# Patient Record
Sex: FEMALE | Race: OTHER | Employment: FULL TIME | URBAN - NONMETROPOLITAN AREA
[De-identification: names, ages, dates, MRNs, and addresses within clinical notes are randomized per-mention and may not be internally consistent; named-entity substitution may affect disease eponyms.]

---

## 2017-01-12 ENCOUNTER — TELEPHONE (OUTPATIENT)
Dept: OTOLARYNGOLOGY | Age: 57
End: 2017-01-12

## 2017-01-12 DIAGNOSIS — R59.0 ENLARGED LYMPH NODE IN NECK: Primary | ICD-10-CM

## 2017-02-27 ENCOUNTER — TELEPHONE (OUTPATIENT)
Dept: OTOLARYNGOLOGY | Age: 57
End: 2017-02-27

## 2017-03-06 DIAGNOSIS — E03.9 HYPOTHYROIDISM, UNSPECIFIED TYPE: ICD-10-CM

## 2017-03-06 RX ORDER — LEVOTHYROXINE SODIUM 25 MCG
TABLET ORAL
Qty: 60 TABLET | Refills: 0 | Status: SHIPPED | OUTPATIENT
Start: 2017-03-06 | End: 2017-04-20 | Stop reason: DRUGHIGH

## 2017-04-17 ENCOUNTER — OFFICE VISIT (OUTPATIENT)
Dept: FAMILY MEDICINE CLINIC | Age: 57
End: 2017-04-17

## 2017-04-17 VITALS
BODY MASS INDEX: 32.62 KG/M2 | SYSTOLIC BLOOD PRESSURE: 124 MMHG | DIASTOLIC BLOOD PRESSURE: 82 MMHG | RESPIRATION RATE: 20 BRPM | TEMPERATURE: 98.1 F | HEIGHT: 65 IN | HEART RATE: 80 BPM | WEIGHT: 195.8 LBS

## 2017-04-17 DIAGNOSIS — Z13.220 SCREENING, LIPID: ICD-10-CM

## 2017-04-17 DIAGNOSIS — D50.9 IRON DEFICIENCY ANEMIA, UNSPECIFIED IRON DEFICIENCY ANEMIA TYPE: ICD-10-CM

## 2017-04-17 DIAGNOSIS — R10.30 LOWER ABDOMINAL PAIN: ICD-10-CM

## 2017-04-17 DIAGNOSIS — R30.0 BURNING WITH URINATION: Primary | ICD-10-CM

## 2017-04-17 DIAGNOSIS — E03.9 HYPOTHYROIDISM, UNSPECIFIED TYPE: ICD-10-CM

## 2017-04-17 PROCEDURE — G8417 CALC BMI ABV UP PARAM F/U: HCPCS | Performed by: FAMILY MEDICINE

## 2017-04-17 PROCEDURE — 3017F COLORECTAL CA SCREEN DOC REV: CPT | Performed by: FAMILY MEDICINE

## 2017-04-17 PROCEDURE — 99214 OFFICE O/P EST MOD 30 MIN: CPT | Performed by: FAMILY MEDICINE

## 2017-04-17 PROCEDURE — 3014F SCREEN MAMMO DOC REV: CPT | Performed by: FAMILY MEDICINE

## 2017-04-17 PROCEDURE — 1036F TOBACCO NON-USER: CPT | Performed by: FAMILY MEDICINE

## 2017-04-17 PROCEDURE — G8427 DOCREV CUR MEDS BY ELIG CLIN: HCPCS | Performed by: FAMILY MEDICINE

## 2017-04-17 RX ORDER — HYDROXYCHLOROQUINE SULFATE 200 MG/1
200 TABLET, FILM COATED ORAL 2 TIMES DAILY
COMMUNITY
Start: 2017-04-04 | End: 2018-03-01

## 2017-04-17 RX ORDER — LEVOTHYROXINE SODIUM 25 MCG
TABLET ORAL
Qty: 60 TABLET | Refills: 3 | Status: CANCELLED | OUTPATIENT
Start: 2017-04-17

## 2017-04-17 ASSESSMENT — ENCOUNTER SYMPTOMS
NAUSEA: 0
VOMITING: 0
ABDOMINAL PAIN: 1
ANAL BLEEDING: 0
CONSTIPATION: 0
BLOOD IN STOOL: 0
DIARRHEA: 0
CHEST TIGHTNESS: 0
SHORTNESS OF BREATH: 0

## 2017-04-20 ENCOUNTER — TELEPHONE (OUTPATIENT)
Dept: FAMILY MEDICINE CLINIC | Age: 57
End: 2017-04-20

## 2017-04-20 DIAGNOSIS — R82.90 ABNORMAL URINE: Primary | ICD-10-CM

## 2017-04-20 DIAGNOSIS — R31.9 BLOOD IN URINE: ICD-10-CM

## 2017-04-20 RX ORDER — LEVOTHYROXINE SODIUM 0.05 MG/1
50 TABLET ORAL DAILY
COMMUNITY
Start: 2017-04-20 | End: 2018-03-01 | Stop reason: SDUPTHER

## 2017-04-20 RX ORDER — ERGOCALCIFEROL (VITAMIN D2) 1250 MCG
50000 CAPSULE ORAL WEEKLY
COMMUNITY
Start: 2017-04-20 | End: 2018-03-01

## 2017-04-26 ENCOUNTER — TELEPHONE (OUTPATIENT)
Dept: FAMILY MEDICINE CLINIC | Age: 57
End: 2017-04-26

## 2017-08-17 ENCOUNTER — OFFICE VISIT (OUTPATIENT)
Dept: FAMILY MEDICINE CLINIC | Age: 57
End: 2017-08-17
Payer: COMMERCIAL

## 2017-08-17 VITALS
SYSTOLIC BLOOD PRESSURE: 124 MMHG | BODY MASS INDEX: 30.96 KG/M2 | TEMPERATURE: 98 F | DIASTOLIC BLOOD PRESSURE: 60 MMHG | RESPIRATION RATE: 16 BRPM | WEIGHT: 185.8 LBS | HEIGHT: 65 IN | HEART RATE: 72 BPM

## 2017-08-17 DIAGNOSIS — M79.10 MYALGIA: ICD-10-CM

## 2017-08-17 DIAGNOSIS — M25.50 ARTHRALGIA, UNSPECIFIED JOINT: Primary | ICD-10-CM

## 2017-08-17 DIAGNOSIS — E55.9 VITAMIN D DEFICIENCY: ICD-10-CM

## 2017-08-17 PROCEDURE — 99214 OFFICE O/P EST MOD 30 MIN: CPT | Performed by: FAMILY MEDICINE

## 2017-08-17 PROCEDURE — 1036F TOBACCO NON-USER: CPT | Performed by: FAMILY MEDICINE

## 2017-08-17 PROCEDURE — G8427 DOCREV CUR MEDS BY ELIG CLIN: HCPCS | Performed by: FAMILY MEDICINE

## 2017-08-17 PROCEDURE — G8417 CALC BMI ABV UP PARAM F/U: HCPCS | Performed by: FAMILY MEDICINE

## 2017-08-17 PROCEDURE — 3017F COLORECTAL CA SCREEN DOC REV: CPT | Performed by: FAMILY MEDICINE

## 2017-08-17 PROCEDURE — 3014F SCREEN MAMMO DOC REV: CPT | Performed by: FAMILY MEDICINE

## 2017-08-17 ASSESSMENT — ENCOUNTER SYMPTOMS
BLOOD IN STOOL: 0
SHORTNESS OF BREATH: 0
CONSTIPATION: 1
ABDOMINAL PAIN: 0
ANAL BLEEDING: 0
VOMITING: 0
CHEST TIGHTNESS: 0
DIARRHEA: 0
NAUSEA: 0

## 2017-10-17 ENCOUNTER — TELEPHONE (OUTPATIENT)
Dept: FAMILY MEDICINE CLINIC | Age: 57
End: 2017-10-17

## 2017-10-17 NOTE — TELEPHONE ENCOUNTER
Per office note dated 8/17/17: COLONOSCOPY done 6/13/2007 per Dr. Bloom Parkinson- to do in 6/2017- pt to discuss options with  and let our office know. Please advise.

## 2017-11-02 ENCOUNTER — OFFICE VISIT (OUTPATIENT)
Dept: RHEUMATOLOGY | Age: 57
End: 2017-11-02
Payer: COMMERCIAL

## 2017-11-02 VITALS
DIASTOLIC BLOOD PRESSURE: 73 MMHG | HEIGHT: 65 IN | BODY MASS INDEX: 30.89 KG/M2 | HEART RATE: 100 BPM | SYSTOLIC BLOOD PRESSURE: 115 MMHG | WEIGHT: 185.4 LBS

## 2017-11-02 DIAGNOSIS — E55.9 VITAMIN D DEFICIENCY: ICD-10-CM

## 2017-11-02 DIAGNOSIS — M25.50 POLYARTHRALGIA: Primary | ICD-10-CM

## 2017-11-02 DIAGNOSIS — H04.123 DRY EYES: ICD-10-CM

## 2017-11-02 PROCEDURE — G8417 CALC BMI ABV UP PARAM F/U: HCPCS | Performed by: INTERNAL MEDICINE

## 2017-11-02 PROCEDURE — G8427 DOCREV CUR MEDS BY ELIG CLIN: HCPCS | Performed by: INTERNAL MEDICINE

## 2017-11-02 PROCEDURE — G8484 FLU IMMUNIZE NO ADMIN: HCPCS | Performed by: INTERNAL MEDICINE

## 2017-11-02 PROCEDURE — 99244 OFF/OP CNSLTJ NEW/EST MOD 40: CPT | Performed by: INTERNAL MEDICINE

## 2017-11-02 PROCEDURE — 3014F SCREEN MAMMO DOC REV: CPT | Performed by: INTERNAL MEDICINE

## 2017-11-02 PROCEDURE — 3017F COLORECTAL CA SCREEN DOC REV: CPT | Performed by: INTERNAL MEDICINE

## 2017-11-02 RX ORDER — MELOXICAM 7.5 MG/1
7.5 TABLET ORAL DAILY
Qty: 30 TABLET | Refills: 2 | Status: SHIPPED | OUTPATIENT
Start: 2017-11-02 | End: 2018-03-01

## 2017-11-02 ASSESSMENT — ENCOUNTER SYMPTOMS
BLURRED VISION: 0
DOUBLE VISION: 0
EYE REDNESS: 0
EYE PAIN: 0
CONSTIPATION: 1
HEARTBURN: 1
RESPIRATORY NEGATIVE: 1

## 2017-11-02 NOTE — PROGRESS NOTES
Bloating and Constipation.  Pcn [Penicillins] Hives    Ciprofloxacin Hcl Rash       CURRENT MEDICATIONS  Current Outpatient Prescriptions   Medication Sig Dispense Refill    ergocalciferol (DRISDOL) 29960 UNITS capsule Take 50,000 Units by mouth once a week       levothyroxine (SYNTHROID) 50 MCG tablet Take 1 tablet by mouth Daily      hydroxychloroquine (PLAQUENIL) 200 MG tablet Take 200 mg by mouth 2 times daily       ranitidine (ZANTAC) 150 MG tablet Take 1 tablet by mouth nightly (Patient taking differently: Take 150 mg by mouth nightly as needed for Heartburn ) 90 tablet 3    ibuprofen (ADVIL) 200 MG tablet Take 1 tablet by mouth every 6 hours as needed for Pain (Patient taking differently: Take 400 mg by mouth every 6 hours as needed for Pain ) 120 tablet 3    aspirin 81 MG tablet Take 81 mg by mouth daily      Multiple Vitamin (MULTIVITAMIN PO) Take  by mouth daily.  esomeprazole Magnesium (NEXIUM) 40 MG PACK Take 20 mg by mouth as needed        No current facility-administered medications for this visit. Objective:  /73   Pulse 100   Ht 5' 5\" (1.651 m)   Wt 185 lb 6.4 oz (84.1 kg)   BMI 30.85 kg/m²     General: No distress. Alert. Eyes: P ERRL. No sclera icterus. No conjunctival injection. ENT: No discharge. Pharynx clear. Neck: Trachea midline. Normal thyroid. Resp: No accessory muscle use. No crackles. No wheezing. No rhonchi. No dullness on percussion. CV: Regular rate. Regular rhythm. No mumur or rub. No edema. GI: Non-tender. Non-distended. No masses. No organmegaly. Normal bowel sounds. M/S:   Upper extremities:  Muscle strength 5/5, FROM, No Synovitis   Shoulders: (+) bilateral Harish, speed, lift off, hawking, infraspinatus and scarf testing.  (+) tenderness of the shoulders  Elbows: bilateral elbow tenderness  Wrist: negative tinel and Phalen testing. Hands: 4th left PIP tenderness. Negative synovitis.      Lower Extremities:   Muscle strength 5/5, FROM, No Synovitis   Hips: left > right lateral hip pain, left hip worse with internal rotation of the hip. Knee: mild crepitus, mild pain with patellar grind, negative bilateral Harish, anterior/posterior draw, medial/lateral compression  Ankles/feet: no swelling, redness,warmth ,or tenderness     Spine:   - C-spine: tenderness along the posterior c-spine.   - Lumbar spine tenderness, negative SLR or Cross SLR.   - Sacral spine tenderness     There are 14/18 tender fibromyalgia points. Neuro: DTR's 2/4 bilat and equal  Psych: Oriented to person, place, time. No anxiety or agitation. Skin: Warm and dry. No nodule on exposed extremities. No rash on exposed extremities. Lymph: No cervical LAD. No supraclavicular LAD.       RAPID 3    LABS:  CBC  Lab Results   Component Value Date    WBC 6.8 04/18/2017    RBC 4.62 04/18/2017    HGB 13.3 04/18/2017    HCT 40.4 04/18/2017    MCV 87.6 04/18/2017    MCH 28.8 04/18/2017    MCHC 32.9 04/18/2017    RDW 13.3 04/18/2017     04/18/2017       CMP  Lab Results   Component Value Date    CALCIUM 9.2 04/18/2017    LABALBU 4.1 04/18/2017    PROT 7.2 04/18/2017     04/18/2017    K 4.4 04/18/2017    CO2 26 04/18/2017    CL 99 04/18/2017    BUN 13 04/18/2017    CREATININE 0.6 04/18/2017    ALT 22 04/18/2017    AST 20 04/18/2017       HgBA1c: No components found for: HGBA1C    Lab Results   Component Value Date    TSH 2.650 04/18/2017     Lab Results   Component Value Date    VITD25 13 04/18/2017         No results found for: AKANKSHA  No components found for: SSAABIGGREF  No components found for: SSBABIGGREF  No components found for: SMITHABIGGAR  No results found for: DSDNAAB   No results found for: C3, C4  No components found for: CYCCITPEPIGG  No results found for: RF    No components found for: CANCASCRN, APANCASCRN  Lab Results   Component Value Date    SEDRATE 2 05/31/2016     Lab Results   Component Value Date    CRP <0.20 05/31/2016       AKANKSHA: negative (IFA)  DsDNA 189 (<302)  [place] <1  U1RNP 1 (<7)  RNP70 (<1) (<7)  SSA < 1 (<7)  SSB < 1 (<7)  CENP < 1 (<7)  Penny-1 < 1 (<7)  RF IgM < 1 (<3.5)  RF IgA 3 (<14)  CCP IgG 1 (< 7)  MCV 9  (< 20)  Cardiolipin IgM 2 (<20)  Cardiolipin IgG < 3 (< 20)  B2GP IgM < 1 (<21)  B2GP IgG < 6 (<21)  Thyroglobulin IgG 48 (40-60 equivocal)  TPO IgG 615 (> 35 positive)     Assessment/ Plan:  1. Polyarthralgia/myalgia:   Generalized joint pains in the fingers, wrist, elbows, shoulders, hips, knees, ankles, feet, neck and back. Most severe pain lower back. Symptoms started: ~ 2 years ago (2015) with joint pain in the shoulder,elbows, fingers, hips, knees. tenderness along the lower legs &  upper arms for the past year (since 2016). Pain on a scale 0-10: pain ranges 5-7/10. Type of pain: constant pain in the shoulders w/ some referral to the upper arms. Shooting pain from the lower back and left lateral thigh to the anterior thigh above the knee. The other joint pains are intermittent localized. Timing: mornings and evenings after sitting down. Aggravating factors: weather changes. Shoulder: prayers, knee: kneeling, prayers. Pressures along the upper arms and lower legs. Alleviating factors: Ibuprofen 800mg qd (moderate to a lot relief). (+) dry mouth/Dry eyes. - exam with wide spread tenderness, 14/18 FMS tender point. (+) lower back tenderness with some radicular sx's in the left leg mainly occurring in the evening. Negative SLR, Cross SLR and intact muscle strength and DTR's.    - x-rays of the shouders and back if sx's continue    - negative serologic evaluation for CTD 12/2016 so lower suspicion for SLE, sjogren's. MCTD, inflammatory myopathy     If normal labs will wean off of plaquenil.     - currently pt would have wide spread sx's consistent with myofascial pain syndrome/Fibromyalgia/pain amplification process and would recommend treating symptomticaly    - Previous therapy: Tylenol (no relief), Naproxen (no relief), prednisone taper (significant relief). - Current therapy: Plaquenil 200mg bid, Ibuprofen 800mg.    - start mobic 7.5mg qd prn. we have discussed the use of nonsteroidal anti-inflammatory medications which include but not limited to Gastrointestinal toxicity (such as ulceration and bleeding), renal toxicity, liver toxicity, and potential cardiovascular toxicity. - hold Ibuprofen. - C-reactive protein; Future  - Sedimentation Rate; Future  - meloxicam (MOBIC) 7.5 MG tablet; Take 1 tablet by mouth daily  Dispense: 30 tablet; Refill: 2     2. Vitamin D def:   - re-evaluation of vitamin D level   - 1-25-oh vitamin D of 99.6 (20-82) 10/26/17    3. Osteoporosis:   - treated by Gynecologist  - was previously treated by w/ alendronate 70mg q wk x 3 years, stopped a few days ago by [employer].   - last DXA was 10/31/17.     4. Sicca sx's   - pt w/ dry eyes and dry mouth. Negative AKANKSHA, SSA, SSB, normal ESR/CRP  - discussed minor salivary gland biopsy but both and I would like to hold off at this time. - with negative serologies, the only way the pt could meet the diagnostic criteria of sjogren's would be to have a (+) minor salivary gland bx  - several other processes can be associated with chronic dry mouth and dry eyes sx's. No Follow-up on file. Electronically signed by Jennifer Callahan DO on 11/2/2017 at 2:52 PM      Thank you for allowing me to participate in the care of this patient. Please call if there are any questions.

## 2018-03-01 ENCOUNTER — HOSPITAL ENCOUNTER (OUTPATIENT)
Age: 58
Discharge: HOME | End: 2018-03-01
Payer: COMMERCIAL

## 2018-03-01 ENCOUNTER — OFFICE VISIT (OUTPATIENT)
Dept: FAMILY MEDICINE CLINIC | Age: 58
End: 2018-03-01
Payer: COMMERCIAL

## 2018-03-01 ENCOUNTER — HOSPITAL ENCOUNTER (OUTPATIENT)
Dept: GENERAL RADIOLOGY | Age: 58
Discharge: HOME | End: 2018-03-01
Payer: COMMERCIAL

## 2018-03-01 VITALS
TEMPERATURE: 98.2 F | HEART RATE: 72 BPM | WEIGHT: 187 LBS | DIASTOLIC BLOOD PRESSURE: 70 MMHG | RESPIRATION RATE: 16 BRPM | SYSTOLIC BLOOD PRESSURE: 104 MMHG | BODY MASS INDEX: 31.16 KG/M2 | HEIGHT: 65 IN

## 2018-03-01 DIAGNOSIS — Z12.31 VISIT FOR SCREENING MAMMOGRAM: ICD-10-CM

## 2018-03-01 DIAGNOSIS — K21.9 GASTROESOPHAGEAL REFLUX DISEASE, ESOPHAGITIS PRESENCE NOT SPECIFIED: ICD-10-CM

## 2018-03-01 DIAGNOSIS — M54.2 NECK PAIN ON RIGHT SIDE: ICD-10-CM

## 2018-03-01 DIAGNOSIS — E73.9 LACTOSE INTOLERANCE: ICD-10-CM

## 2018-03-01 DIAGNOSIS — Z12.11 ENCOUNTER FOR SCREENING COLONOSCOPY: ICD-10-CM

## 2018-03-01 DIAGNOSIS — R30.0 DYSURIA: ICD-10-CM

## 2018-03-01 DIAGNOSIS — E03.9 HYPOTHYROIDISM, UNSPECIFIED TYPE: Primary | ICD-10-CM

## 2018-03-01 PROCEDURE — 3014F SCREEN MAMMO DOC REV: CPT | Performed by: FAMILY MEDICINE

## 2018-03-01 PROCEDURE — 99214 OFFICE O/P EST MOD 30 MIN: CPT | Performed by: FAMILY MEDICINE

## 2018-03-01 PROCEDURE — G8417 CALC BMI ABV UP PARAM F/U: HCPCS | Performed by: FAMILY MEDICINE

## 2018-03-01 PROCEDURE — G8427 DOCREV CUR MEDS BY ELIG CLIN: HCPCS | Performed by: FAMILY MEDICINE

## 2018-03-01 PROCEDURE — 1036F TOBACCO NON-USER: CPT | Performed by: FAMILY MEDICINE

## 2018-03-01 PROCEDURE — 72050 X-RAY EXAM NECK SPINE 4/5VWS: CPT

## 2018-03-01 PROCEDURE — 3017F COLORECTAL CA SCREEN DOC REV: CPT | Performed by: FAMILY MEDICINE

## 2018-03-01 PROCEDURE — G8484 FLU IMMUNIZE NO ADMIN: HCPCS | Performed by: FAMILY MEDICINE

## 2018-03-01 RX ORDER — LEVOTHYROXINE SODIUM 0.05 MG/1
50 TABLET ORAL DAILY
Qty: 90 TABLET | Refills: 3 | Status: SHIPPED | OUTPATIENT
Start: 2018-03-01 | End: 2018-05-30 | Stop reason: SDUPTHER

## 2018-03-01 RX ORDER — CYCLOBENZAPRINE HCL 5 MG
5 TABLET ORAL 3 TIMES DAILY PRN
Qty: 30 TABLET | Refills: 0 | Status: SHIPPED | OUTPATIENT
Start: 2018-03-01 | End: 2018-03-11

## 2018-03-01 ASSESSMENT — ENCOUNTER SYMPTOMS
SHORTNESS OF BREATH: 0
CHEST TIGHTNESS: 0
VOMITING: 0
CONSTIPATION: 1
ANAL BLEEDING: 0
ABDOMINAL PAIN: 0
NAUSEA: 0
DIARRHEA: 1
BLOOD IN STOOL: 0

## 2018-03-01 NOTE — PROGRESS NOTES
[facility]  [facility]  Dept: [phone]  Dept Fax: [phone]    [facility] Denise Rhoades is a 56 y. o.female    Pt feeling ok since last visit- no new complaints, issues, or problems, except as noted below:     Pt saw Dr. Francheska Davenport since last visit- never tried Mobic to help with pain as pt wished to avoid    Pt missed follow up appt- pt to reschedule soon. Pt states she was recently in [place] and felt significantly better without aches and pains. Pt tripped this morning over small bush and landed face first- no LOC. Pt hit chin on grass as she fell. Pt now with pain in neck region and into right shoulder. No extremity numbness, tingling, or weakness. Pt scheduled to have eye muscle surgery with Dr. Jasper Michelle on 3/26/2018 in [place], [place]. Patient Active Problem List   Diagnosis    Lactose intolerance    Iron deficiency anemia    Diplopia    Osteoporosis    Hypothyroidism    Gastroesophageal reflux disease       Current Outpatient Prescriptions   Medication Sig Dispense Refill    levothyroxine (SYNTHROID) 50 MCG tablet Take 1 tablet by mouth Daily 90 tablet 3    cyclobenzaprine (FLEXERIL) 5 MG tablet Take 1 tablet by mouth 3 times daily as needed for Muscle spasms 30 tablet 0    ranitidine (ZANTAC) 150 MG tablet Take 1 tablet by mouth nightly (Patient taking differently: Take 150 mg by mouth nightly as needed for Heartburn ) 90 tablet 3    ibuprofen (ADVIL) 200 MG tablet Take 1 tablet by mouth every 6 hours as needed for Pain (Patient taking differently: Take 400 mg by mouth every 6 hours as needed for Pain ) 120 tablet 3    esomeprazole Magnesium (NEXIUM) 40 MG PACK Take 20 mg by mouth as needed       Multiple Vitamin (MULTIVITAMIN PO) Take  by mouth daily. No current facility-administered medications for this visit. Review of Systems   Constitutional: Positive for chills (occasionally).  Negative for diaphoresis, fatigue, fever and unexpected weight change. Eyes: Negative for visual disturbance. Respiratory: Negative for chest tightness and shortness of breath. Cardiovascular: Negative for chest pain, palpitations and leg swelling. Gastrointestinal: Positive for constipation (intermittent,  due to diet) and diarrhea ( intermittent,  due to diet). Negative for abdominal pain, anal bleeding, blood in stool, nausea and vomiting. Genitourinary: Negative for dysuria and hematuria. Musculoskeletal: Positive for arthralgias (neck). Negative for neck pain. Neurological: Negative for dizziness, light-headedness and headaches. OBJECTIVE     /70 (Site: Left Arm, Position: Sitting)   Pulse 72   Temp 98.2 °F (36.8 °C) (Oral)   Resp 16   Ht 5' 5\" (1.651 m)   Wt 187 lb (84.8 kg)   BMI 31.12 kg/m²     Wt Readings from Last 3 Encounters:   03/01/18 187 lb (84.8 kg)   11/02/17 185 lb 6.4 oz (84.1 kg)   08/17/17 185 lb 12.8 oz (84.3 kg)     Body mass index is 31.12 kg/m². Physical Exam   Constitutional: She is oriented to person, place, and time. She appears well-developed and well-nourished. HENT:   Head: Normocephalic and atraumatic. Right Ear: External ear normal.   Left Ear: External ear normal.   Nose: Nose normal.   Mouth/Throat: Oropharynx is clear and moist.   Eyes: Conjunctivae and EOM are normal. Pupils are equal, round, and reactive to light. Neck: Normal range of motion. Neck supple. Cardiovascular: Normal rate, regular rhythm, normal heart sounds and intact distal pulses. Pulmonary/Chest: Effort normal and breath sounds normal.   Abdominal: Soft. Bowel sounds are normal.   Musculoskeletal: Normal range of motion. Neurological: She is alert and oriented to person, place, and time. She has normal reflexes. Skin: Skin is warm and dry. Psychiatric: She has a normal mood and affect. Her behavior is normal. Judgment and thought content normal.   Nursing note and vitals reviewed.             Component 3/1/2018)  Encouraged TETANUS SHOT (TdaP/ ADACEL/ BOOSTRIX) per personal pharmacy. Encouraged NEW SHINGLES SHOT [facility]) - check with insurance re coverage and location of administration. PAP/ PELVIC management per Dr. Faizan Ayers- done 9/2017- to follow up annually. MAMMO overdue at this time- order given. COLONOSCOPY done 6/13/2007 per Dr. Mercedes Jin- was to do in 6/2017-  Will refer to Dr. Mercedes Jin at this time. DEXA done 10/31/2017 per Dr. Tanya Cisneros- to do again 10/2019. Pt declines HIV and HEP C screening at this time due to lack of risk factors. Check x-rays of cervical spine  Flexeril 5 mg- 1 pill 3x/day for 10 days #30/ no refills  OK to continue Ibuprofen  mg- 4 pills 3x/day as needed for pain. Gentle massage to affected area. Follow up with Dr. Palma Valencia as planned  Check FLP, CMP, UA with reflex C&S, FREE T4, and TSH with VIT D 25 OH, ESR, CRP per Dr. Ravindra Dotson after 4/18/2018  Refill   Follow up in 6 months.         Electronically signed by Carmen Wilder MD on 3/1/2018 at 10:00 AM

## 2018-04-27 ENCOUNTER — TELEPHONE (OUTPATIENT)
Dept: FAMILY MEDICINE CLINIC | Age: 58
End: 2018-04-27

## 2018-04-27 DIAGNOSIS — R79.89 ELEVATED TSH: Primary | ICD-10-CM

## 2018-04-27 DIAGNOSIS — E03.9 HYPOTHYROIDISM, UNSPECIFIED TYPE: ICD-10-CM

## 2018-04-27 LAB
ALBUMIN SERPL-MCNC: 4.3 G/DL (ref 3.5–5.2)
ALK PHOSPHATASE: 77 U/L (ref 30–111)
ALT SERPL-CCNC: 20 U/L (ref 5–40)
ANION GAP SERPL CALCULATED.3IONS-SCNC: 14 MEQ/L (ref 10–19)
AST SERPL-CCNC: 17 U/L (ref 9–40)
BILIRUB SERPL-MCNC: 0.5 MG/DL
BUN BLDV-MCNC: 17 MG/DL (ref 8–23)
CALCIUM SERPL-MCNC: 8.8 MG/DL (ref 8.5–10.5)
CHLORIDE BLD-SCNC: 101 MEQ/L (ref 95–107)
CO2: 26 MEQ/L (ref 19–31)
CREAT SERPL-MCNC: 0.7 MG/DL (ref 0.6–1.3)
EGFR AFRICAN AMERICAN: 113 ML/MIN/1.73 M2
EGFR IF NONAFRICAN AMERICAN: 97.5 ML/MIN/1.73 M2
GLUCOSE: 80 MG/DL (ref 70–99)
POTASSIUM SERPL-SCNC: 4.7 MEQ/L (ref 3.5–5.4)
SODIUM BLD-SCNC: 141 MEQ/L (ref 135–146)
T4 FREE: 1.37 NG/DL (ref 0.8–1.9)
TOTAL PROTEIN: 6.6 G/DL (ref 6.1–8.3)
TSH SERPL DL<=0.05 MIU/L-ACNC: 4.59 UIU/ML (ref 0.4–4.1)

## 2018-05-30 RX ORDER — LEVOTHYROXINE SODIUM 0.07 MG/1
75 TABLET ORAL DAILY
Qty: 30 TABLET | Refills: 1 | Status: SHIPPED | OUTPATIENT
Start: 2018-05-30 | End: 2018-07-10 | Stop reason: SDUPTHER

## 2018-07-07 LAB
T4 FREE: 1.44 NG/DL (ref 0.8–1.9)
TSH SERPL DL<=0.05 MIU/L-ACNC: 1.98 UIU/ML (ref 0.4–4.1)

## 2018-07-10 ENCOUNTER — TELEPHONE (OUTPATIENT)
Dept: FAMILY MEDICINE CLINIC | Age: 58
End: 2018-07-10

## 2018-07-10 DIAGNOSIS — E03.9 HYPOTHYROIDISM, UNSPECIFIED TYPE: ICD-10-CM

## 2018-07-10 RX ORDER — LEVOTHYROXINE SODIUM 0.07 MG/1
75 TABLET ORAL DAILY
Qty: 90 TABLET | Refills: 3 | Status: SHIPPED | OUTPATIENT
Start: 2018-07-10 | End: 2019-07-24 | Stop reason: SDUPTHER

## 2018-07-10 NOTE — TELEPHONE ENCOUNTER
Pt notified of results. She states that she will need a refill and is asking for #90/3. Med pended, pharmacy verified.

## 2018-09-06 ENCOUNTER — TELEPHONE (OUTPATIENT)
Dept: FAMILY MEDICINE CLINIC | Age: 58
End: 2018-09-06

## 2018-09-06 DIAGNOSIS — R39.15 URINARY URGENCY: ICD-10-CM

## 2018-09-06 DIAGNOSIS — R30.0 DYSURIA: ICD-10-CM

## 2018-09-06 DIAGNOSIS — Z00.00 LABORATORY EXAMINATION ORDERED AS PART OF A ROUTINE GENERAL MEDICAL EXAMINATION: Primary | ICD-10-CM

## 2018-09-06 NOTE — TELEPHONE ENCOUNTER
Patient rescheduled today's appointment to 9/18/18. She is asking if she needs to do any labs prior? Please advise and let her know.

## 2018-09-18 ENCOUNTER — TELEPHONE (OUTPATIENT)
Dept: FAMILY MEDICINE CLINIC | Age: 58
End: 2018-09-18

## 2018-09-18 NOTE — TELEPHONE ENCOUNTER
Patient called and cancelled today's appt, wondering if there was anyway that she could possibly make an appt on any Thursday mornings that she could be fit in please. Dr. Calvin Santizo schedule is booked. Pt did state she did not have blood work done, pt did r/s from 9/6 and then from today.     Please advise pt [phone]

## 2018-11-15 ENCOUNTER — TELEPHONE (OUTPATIENT)
Dept: FAMILY MEDICINE CLINIC | Age: 58
End: 2018-11-15

## 2018-12-17 ENCOUNTER — HOSPITAL ENCOUNTER (OUTPATIENT)
Dept: WOMENS IMAGING | Age: 58
Discharge: HOME | End: 2018-12-17
Payer: COMMERCIAL

## 2018-12-17 DIAGNOSIS — Z12.31 VISIT FOR SCREENING MAMMOGRAM: ICD-10-CM

## 2018-12-17 PROCEDURE — 77067 SCR MAMMO BI INCL CAD: CPT

## 2019-01-21 ENCOUNTER — HOSPITAL ENCOUNTER (OUTPATIENT)
Dept: NON INVASIVE DIAGNOSTICS | Age: 59
Discharge: HOME | End: 2019-01-21
Payer: COMMERCIAL

## 2019-01-21 VITALS — HEIGHT: 66 IN | WEIGHT: 172 LBS | BODY MASS INDEX: 27.64 KG/M2

## 2019-01-21 PROCEDURE — 93660 TILT TABLE EVALUATION: CPT | Performed by: INTERNAL MEDICINE

## 2019-01-21 PROCEDURE — 2709999900 HC NON-CHARGEABLE SUPPLY

## 2019-07-24 DIAGNOSIS — E03.9 HYPOTHYROIDISM, UNSPECIFIED TYPE: ICD-10-CM

## 2019-07-26 RX ORDER — LEVOTHYROXINE SODIUM 0.07 MG/1
TABLET ORAL
Qty: 30 TABLET | Refills: 0 | Status: SHIPPED | OUTPATIENT
Start: 2019-07-26 | End: 2019-08-05 | Stop reason: SDUPTHER

## 2019-08-05 ENCOUNTER — OFFICE VISIT (OUTPATIENT)
Dept: FAMILY MEDICINE CLINIC | Age: 59
End: 2019-08-05
Payer: COMMERCIAL

## 2019-08-05 VITALS
RESPIRATION RATE: 14 BRPM | TEMPERATURE: 97.9 F | SYSTOLIC BLOOD PRESSURE: 110 MMHG | DIASTOLIC BLOOD PRESSURE: 68 MMHG | WEIGHT: 174 LBS | HEART RATE: 64 BPM | BODY MASS INDEX: 28.08 KG/M2

## 2019-08-05 DIAGNOSIS — M77.02 MEDIAL EPICONDYLITIS OF ELBOW, LEFT: ICD-10-CM

## 2019-08-05 DIAGNOSIS — M81.0 OSTEOPOROSIS WITHOUT CURRENT PATHOLOGICAL FRACTURE, UNSPECIFIED OSTEOPOROSIS TYPE: ICD-10-CM

## 2019-08-05 DIAGNOSIS — K21.9 GASTROESOPHAGEAL REFLUX DISEASE, ESOPHAGITIS PRESENCE NOT SPECIFIED: ICD-10-CM

## 2019-08-05 DIAGNOSIS — Z12.11 COLON CANCER SCREENING: ICD-10-CM

## 2019-08-05 DIAGNOSIS — M79.672 PAIN OF LEFT HEEL: ICD-10-CM

## 2019-08-05 DIAGNOSIS — D64.9 ANEMIA, UNSPECIFIED TYPE: ICD-10-CM

## 2019-08-05 DIAGNOSIS — E03.9 HYPOTHYROIDISM, UNSPECIFIED TYPE: Primary | ICD-10-CM

## 2019-08-05 PROCEDURE — 99215 OFFICE O/P EST HI 40 MIN: CPT | Performed by: FAMILY MEDICINE

## 2019-08-05 PROCEDURE — 1036F TOBACCO NON-USER: CPT | Performed by: FAMILY MEDICINE

## 2019-08-05 PROCEDURE — 3017F COLORECTAL CA SCREEN DOC REV: CPT | Performed by: FAMILY MEDICINE

## 2019-08-05 PROCEDURE — G8419 CALC BMI OUT NRM PARAM NOF/U: HCPCS | Performed by: FAMILY MEDICINE

## 2019-08-05 PROCEDURE — G8427 DOCREV CUR MEDS BY ELIG CLIN: HCPCS | Performed by: FAMILY MEDICINE

## 2019-08-05 RX ORDER — LEVOTHYROXINE SODIUM 0.07 MG/1
TABLET ORAL
Qty: 90 TABLET | Refills: 3 | Status: SHIPPED | OUTPATIENT
Start: 2019-08-05 | End: 2020-07-28

## 2019-08-05 ASSESSMENT — PATIENT HEALTH QUESTIONNAIRE - PHQ9
SUM OF ALL RESPONSES TO PHQ QUESTIONS 1-9: 0
SUM OF ALL RESPONSES TO PHQ9 QUESTIONS 1 & 2: 0
SUM OF ALL RESPONSES TO PHQ QUESTIONS 1-9: 0
1. LITTLE INTEREST OR PLEASURE IN DOING THINGS: 0
2. FEELING DOWN, DEPRESSED OR HOPELESS: 0

## 2019-08-05 ASSESSMENT — ENCOUNTER SYMPTOMS
ANAL BLEEDING: 0
SHORTNESS OF BREATH: 0
BLOOD IN STOOL: 0
CONSTIPATION: 0
VOMITING: 0
DIARRHEA: 0
NAUSEA: 0
ABDOMINAL PAIN: 0
CHEST TIGHTNESS: 0

## 2019-08-05 NOTE — PROGRESS NOTES
[facility]  [facility]  Dept: [phone]  Dept Fax: [phone]    [facility] Jossie Roche is a 66 y. o.female    Pt presents for follow up of Hypothyroidism     Pt feeling ok since last visit- no new complaints, issues, or problems, except as noted below:   Pt had 3 episodes of lightheadedness/ fainting in 2/2019 while in [place] and back home. Pt had Tilt Table, Stress test, Echo- all ok. No issues since that time. Pt continues seeing Dr. Pati Kelley for Iron Deficiency Anemia. Pt seeing her dentist for right sided jaw pain- to see 9/4/2019. Weight idecreased 13# since last visit 17 months ago. Pt complains of left heel pain- worst in AM- intermittent in nature. No numbness, tingling, or weakness. Pt interested in seeing Podiatry at this time. Pt complains of \"flesh hurting\" in bicep/ tricep region, as well as between knees and ankles bilaterally. Pt also with left medial epicondyle pain with movement. Pt states pain is intermittent and worse with movement. Patient Active Problem List   Diagnosis    Lactose intolerance    Iron deficiency anemia    Diplopia    Osteoporosis    Hypothyroidism    Gastroesophageal reflux disease       Current Outpatient Medications   Medication Sig Dispense Refill    levothyroxine (SYNTHROID) 75 MCG tablet TAKE 1 TABLET BY MOUTH ONCE DAILY 90 tablet 3    ranitidine (ZANTAC) 150 MG tablet Take 1 tablet by mouth nightly (Patient taking differently: Take 150 mg by mouth nightly as needed for Heartburn ) 90 tablet 3    ibuprofen (ADVIL) 200 MG tablet Take 1 tablet by mouth every 6 hours as needed for Pain (Patient taking differently: Take 400 mg by mouth every 6 hours as needed for Pain ) 120 tablet 3    esomeprazole Magnesium (NEXIUM) 40 MG PACK Take 40 mg by mouth as needed       Multiple Vitamin (MULTIVITAMIN PO) Take  by mouth daily. No current facility-administered medications for this visit. Gastroesophageal reflux disease, esophagitis presence not specified     5. Colon cancer screening  AFL - Artnivia Pretty MD, Gastroenterology, [facility]   6. Pain of left heel  Amb External Referral To Podiatry   7. Medial epicondylitis of elbow, left  Tonie Stockton MD, Orthopedic Surgery, [facility]      Encouraged annual FLU VACCINE- pt declines. (updated 8/5/2019)  Encouraged TETANUS SHOT (TdaP/ ADACEL/ BOOSTRIX) per personal pharmacy. Encouraged NEW SHINGLES SHOT [facility]) - check with insurance re coverage and location of administration. (Pt declines at this time- 8/5/2019)  PAP/ PELVIC management per Dr. Dawn- done 9/2017??- to follow up annually. Please check on date of next appt ([id number])  MAMMO due after 12/17/2019- to get order per Dr. Dawn per pt preference. COLONOSCOPY done 6/13/2007 per Dr. Chana Ibarra- was to do in 6/2017-  Will refer to Dr. Luba Roque at this time. Will also consider EGD to evaluate for causes of Iron Deficiency Anemia. (updated 8/5/2019)  DEXA done 10/31/2017 per Dr. Dionna Calderon- to do again 10/2019- pt to discuss with Dr. Dawn at upcoming appt. Pt declines HIV and HEP C screening at this time due to lack of risk factors. (updated 8/5/2019)  Will hold on repeat labs at this time as completed in 2/2019 and ok per pt report- will track down copy of these labs. After discussion with pt, will refer to Dr. Jasmyn Gore at this time. After discussion with pt, will refer to Dr. Andry Schaffer for evaluation of left medial epicondyle pain. Refill Synthroid  Follow up in 6-12 months. Electronically signed by Merlin Morrison MD on 8/5/2019 at 12:29 PM     Greater than 40 minutes was spent in face-to face contact with patient with greater than 50% in counseling and coordination of care.

## 2019-11-19 ENCOUNTER — HOSPITAL ENCOUNTER (OUTPATIENT)
Dept: WOMENS IMAGING | Age: 59
Discharge: HOME | End: 2019-11-19
Payer: COMMERCIAL

## 2019-11-19 DIAGNOSIS — M85.89 OSTEOPENIA OF MULTIPLE SITES: ICD-10-CM

## 2019-11-19 PROCEDURE — 77080 DXA BONE DENSITY AXIAL: CPT

## 2019-12-18 ENCOUNTER — HOSPITAL ENCOUNTER (OUTPATIENT)
Dept: WOMENS IMAGING | Age: 59
Discharge: HOME | End: 2019-12-18
Payer: COMMERCIAL

## 2019-12-18 DIAGNOSIS — Z12.31 VISIT FOR SCREENING MAMMOGRAM: ICD-10-CM

## 2019-12-18 PROCEDURE — 77063 BREAST TOMOSYNTHESIS BI: CPT

## 2020-01-22 ENCOUNTER — OFFICE VISIT (OUTPATIENT)
Dept: SURGERY | Age: 60
End: 2020-01-22
Payer: COMMERCIAL

## 2020-01-22 VITALS
HEIGHT: 66 IN | TEMPERATURE: 97.6 F | WEIGHT: 177 LBS | OXYGEN SATURATION: 98 % | SYSTOLIC BLOOD PRESSURE: 118 MMHG | DIASTOLIC BLOOD PRESSURE: 80 MMHG | RESPIRATION RATE: 18 BRPM | BODY MASS INDEX: 28.45 KG/M2 | HEART RATE: 80 BPM

## 2020-01-22 PROCEDURE — 1036F TOBACCO NON-USER: CPT | Performed by: SURGERY

## 2020-01-22 PROCEDURE — G8484 FLU IMMUNIZE NO ADMIN: HCPCS | Performed by: SURGERY

## 2020-01-22 PROCEDURE — G8419 CALC BMI OUT NRM PARAM NOF/U: HCPCS | Performed by: SURGERY

## 2020-01-22 PROCEDURE — G8427 DOCREV CUR MEDS BY ELIG CLIN: HCPCS | Performed by: SURGERY

## 2020-01-22 PROCEDURE — 3017F COLORECTAL CA SCREEN DOC REV: CPT | Performed by: SURGERY

## 2020-01-22 PROCEDURE — 99203 OFFICE O/P NEW LOW 30 MIN: CPT | Performed by: SURGERY

## 2020-01-23 ASSESSMENT — ENCOUNTER SYMPTOMS
WHEEZING: 0
COLOR CHANGE: 0
CHOKING: 0
COUGH: 0
NAUSEA: 0
EYE ITCHING: 0
VOMITING: 0
DIARRHEA: 0
ABDOMINAL PAIN: 0
SINUS PRESSURE: 0
BLOOD IN STOOL: 0
ALLERGIC/IMMUNOLOGIC NEGATIVE: 1
EYE DISCHARGE: 0
VOICE CHANGE: 0
PHOTOPHOBIA: 0
CHEST TIGHTNESS: 0
RHINORRHEA: 0
STRIDOR: 0
APNEA: 0
ABDOMINAL DISTENTION: 0
BACK PAIN: 0
CONSTIPATION: 0
EYE REDNESS: 0
EYE PAIN: 0
TROUBLE SWALLOWING: 0
ANAL BLEEDING: 0
RECTAL PAIN: 1
SHORTNESS OF BREATH: 0
FACIAL SWELLING: 0
SORE THROAT: 0

## 2020-01-23 NOTE — PROGRESS NOTES
Subjective:      Patient ID: John Trevino is a 64 y.o. female. Chief Complaint   Patient presents with    Surgical Consult     New patient-referred by Dr Silvia mccoy     HPI  Kari Estrada is a 61-year-old female who presents for initial evaluation secondary to anal pain. This is been going on now for several weeks. Colonoscopy back in December 2, 2019. At that time found to have some prolapsing internal hemorrhoids along with posterior anal infection/abscess. She states she has really had no improvement. Concern for fissure. Very painful especially after bowel movements. Tender to palpation. Occasionally will have the area drained and then it seems to get better only to get worse again. Colonoscopy otherwise did not demonstrate any significant concerning polyps. Otherwise, tolerating diet. Normal bowel function and denies any significant chronic severe constipation. No hematochezia or melena. No unexplained weight loss. No fever, chills or sweats. No chest pain or shortness of breath. Review of Systems   Constitutional: Positive for chills, diaphoresis and fatigue. Negative for activity change, appetite change, fever and unexpected weight change. HENT: Negative for congestion, dental problem, drooling, ear discharge, ear pain, facial swelling, hearing loss, mouth sores, nosebleeds, postnasal drip, rhinorrhea, sinus pressure, sneezing, sore throat, tinnitus, trouble swallowing and voice change. Eyes: Negative for photophobia, pain, discharge, redness, itching and visual disturbance. Respiratory: Negative for apnea, cough, choking, chest tightness, shortness of breath, wheezing and stridor. Cardiovascular: Negative for chest pain, palpitations and leg swelling. Gastrointestinal: Positive for rectal pain. Negative for abdominal distention, abdominal pain, anal bleeding, blood in stool, constipation, diarrhea, nausea and vomiting. Endocrine: Negative.     Genitourinary: Negative for decreased urine volume, difficulty urinating, dyspareunia, dysuria, enuresis, flank pain, frequency, genital sores, hematuria, menstrual problem, pelvic pain, urgency, vaginal bleeding, vaginal discharge and vaginal pain. Musculoskeletal: Positive for arthralgias and neck pain. Negative for back pain, gait problem, joint swelling, myalgias and neck stiffness. Skin: Negative for color change, pallor, rash and wound. Allergic/Immunologic: Negative. Neurological: Negative for dizziness, tremors, seizures, syncope, facial asymmetry, speech difficulty, weakness, light-headedness, numbness and headaches. Hematological: Negative for adenopathy. Does not bruise/bleed easily. Psychiatric/Behavioral: Negative for agitation, behavioral problems, confusion, decreased concentration, dysphoric mood, hallucinations, self-injury, sleep disturbance and suicidal ideas. The patient is not nervous/anxious and is not hyperactive. Past Medical History:   Diagnosis Date    Dry eyes     Hypothyroid     Iron deficiency anemia     Lactose intolerance     Osteopenia     Polyarthralgia     Vitamin D deficiency        Past Surgical History:   Procedure Laterality Date    APPENDECTOMY  1973    COLONOSCOPY  12/02/2019    Dr Marcelo Gordon, [place]- Dr. Barrett Guess  12/02/2019    Dr Anastacio Sanders       Current Outpatient Medications   Medication Sig Dispense Refill    CALCIUM-VITAMIN D PO Take by mouth daily      levothyroxine (SYNTHROID) 75 MCG tablet TAKE 1 TABLET BY MOUTH ONCE DAILY 90 tablet 3    ibuprofen (ADVIL) 200 MG tablet Take 1 tablet by mouth every 6 hours as needed for Pain (Patient taking differently: Take 400 mg by mouth every 6 hours as needed for Pain ) 120 tablet 3    esomeprazole Magnesium (NEXIUM) 40 MG PACK Take 40 mg by mouth as needed       Multiple Vitamin (MULTIVITAMIN PO) Take  by mouth daily.          No current facility-administered medications for this visit. Allergies   Allergen Reactions    Lactose Other (See Comments)     Bloating and Constipation.  Pcn [Penicillins] Hives    Ciprofloxacin Hcl Rash       Family History   Problem Relation Age of Onset    Heart Disease Mother     Diabetes Mother     Heart Disease Father     Diabetes Son        Social History     Socioeconomic History    Marital status:      Spouse name: Not on file    Number of children: Not on file    Years of education: Not on file    Highest education level: Not on file   Occupational History    Not on file   Social Needs    Financial resource strain: Not on file    Food insecurity:     Worry: Not on file     Inability: Not on file    Transportation needs:     Medical: Not on file     Non-medical: Not on file   Tobacco Use    Smoking status: Never Smoker    Smokeless tobacco: Never Used   Substance and Sexual Activity    Alcohol use: No     Alcohol/week: 0.0 standard drinks    Drug use: No    Sexual activity: Not on file   Lifestyle    Physical activity:     Days per week: Not on file     Minutes per session: Not on file    Stress: Not on file   Relationships    Social connections:     Talks on phone: Not on file     Gets together: Not on file     Attends Mu-ism service: Not on file     Active member of club or organization: Not on file     Attends meetings of clubs or organizations: Not on file     Relationship status: Not on file    Intimate partner violence:     Fear of current or ex partner: Not on file     Emotionally abused: Not on file     Physically abused: Not on file     Forced sexual activity: Not on file   Other Topics Concern    Not on file   Social History Narrative    Not on file     Vitals:    01/22/20 1016   BP: 118/80   Pulse: 80   Resp: 18   Temp: 97.6 °F (36.4 °C)   SpO2: 98%     Body mass index is 28.57 kg/m².     Objective:   Physical Exam  Constitutional:       General: She is not in No cervical adenopathy. Upper Body:      Right upper body: No supraclavicular adenopathy. Left upper body: No supraclavicular adenopathy. Skin:     General: Skin is warm and dry. Coloration: Skin is not pale. Findings: No abrasion, bruising, burn, erythema, laceration or rash. Nails: There is no clubbing. Neurological:      Mental Status: She is alert and oriented to person, place, and time. Cranial Nerves: No cranial nerve deficit. Sensory: No sensory deficit. Gait: Gait normal.   Psychiatric:         Speech: Speech normal.         Behavior: Behavior normal. Behavior is cooperative. Thought Content: Thought content normal.         Judgment: Judgment normal.       Lab Results   Component Value Date     04/26/2018    K 4.7 04/26/2018     04/26/2018    CO2 26 04/26/2018     Lab Results   Component Value Date    CREATININE 0.7 04/26/2018     Lab Results   Component Value Date    WBC 6.8 04/18/2017    HGB 13.3 04/18/2017    HCT 40.4 04/18/2017    MCV 87.6 04/18/2017     04/18/2017     Imaging -            Patient Active Problem List   Diagnosis    Lactose intolerance    Iron deficiency anemia    Diplopia    Osteoporosis    Hypothyroidism    Gastroesophageal reflux disease     Assessment:      1. Anal pain  2. Anal fissure  3. Possible fistula in anal  4. Perianal infection/abscess      Plan:      1. Schedule [facility] for anal exam under anesthesia with possible lateral internal sphincterotomy; possible hemorrhoidectomy; possible fistulotomy; possible I&D abscess. 2. She will undergo pre-operative clearance per anesthesia guidelines with risk factors listed under the past medical history diagnosis & problem list.  3. The risks, benefits and alternatives were discussed with Esme Lr including non-operative management. All questions answered. She understands and wishes to proceed with surgical intervention.   4. Restrictions discussed with

## 2020-02-05 LAB
HCT VFR BLD CALC: 36.1 % (ref 35–47)
HEMOGLOBIN: 11.9 G/DL (ref 11.7–16)
TSH SERPL DL<=0.05 MIU/L-ACNC: 2.63 UIU/ML (ref 0.49–4.67)

## 2020-02-08 NOTE — H&P
Subjective:      Patient ID: Nikki Alexander is a 64 y.o. female.          Chief Complaint   Patient presents with    Surgical Consult       New patient-referred by Dr Aidan Mathis is a 62-year-old female who presents for initial evaluation secondary to anal pain. This is been going on now for several weeks. Colonoscopy back in December 2, 2019. At that time found to have some prolapsing internal hemorrhoids along with posterior anal infection/abscess. She states she has really had no improvement. Concern for fissure. Very painful especially after bowel movements. Tender to palpation. Occasionally will have the area drained and then it seems to get better only to get worse again. Colonoscopy otherwise did not demonstrate any significant concerning polyps. Otherwise, tolerating diet. Normal bowel function and denies any significant chronic severe constipation. No hematochezia or melena. No unexplained weight loss. No fever, chills or sweats. No chest pain or shortness of breath.     Review of Systems   Constitutional: Positive for chills, diaphoresis and fatigue. Negative for activity change, appetite change, fever and unexpected weight change. HENT: Negative for congestion, dental problem, drooling, ear discharge, ear pain, facial swelling, hearing loss, mouth sores, nosebleeds, postnasal drip, rhinorrhea, sinus pressure, sneezing, sore throat, tinnitus, trouble swallowing and voice change. Eyes: Negative for photophobia, pain, discharge, redness, itching and visual disturbance. Respiratory: Negative for apnea, cough, choking, chest tightness, shortness of breath, wheezing and stridor. Cardiovascular: Negative for chest pain, palpitations and leg swelling. Gastrointestinal: Positive for rectal pain. Negative for abdominal distention, abdominal pain, anal bleeding, blood in stool, constipation, diarrhea, nausea and vomiting. Endocrine: Negative. Social History Narrative    Not on file             Vitals:     01/22/20 1016   BP: 118/80   Pulse: 80   Resp: 18   Temp: 97.6 °F (36.4 °C)   SpO2: 98%      Body mass index is 28.57 kg/m².     Objective:   Physical Exam  Constitutional:       General: She is not in acute distress. Appearance: She is well-developed. She is not ill-appearing, toxic-appearing or diaphoretic. HENT:      Head: Normocephalic and atraumatic. Not macrocephalic and not microcephalic. No raccoon eyes, Forde's sign, abrasion, contusion, right periorbital erythema, left periorbital erythema or laceration. Right Ear: External ear normal.      Left Ear: External ear normal.      Nose: Nose normal.      Mouth/Throat:      Pharynx: No oropharyngeal exudate. Eyes:      General: No scleral icterus. Right eye: No discharge. Left eye: No discharge. Conjunctiva/sclera: Conjunctivae normal.      Pupils: Pupils are equal, round, and reactive to light. Neck:      Musculoskeletal: Full passive range of motion without pain, normal range of motion and neck supple. Thyroid: No thyroid mass or thyromegaly. Vascular: No JVD. Trachea: Trachea and phonation normal. No tracheal tenderness or tracheal deviation. Cardiovascular:      Rate and Rhythm: Normal rate and regular rhythm. Pulses: Normal pulses. Heart sounds: Normal heart sounds, S1 normal and S2 normal.   Pulmonary:      Effort: Pulmonary effort is normal. No respiratory distress. Breath sounds: Normal breath sounds. No stridor. No decreased breath sounds, wheezing or rales. Chest:      Chest wall: No deformity or tenderness. Abdominal:      General: Bowel sounds are normal. There is no distension or abdominal bruit. Palpations: Abdomen is soft. Abdomen is not rigid. There is no fluid wave, mass or pulsatile mass. Tenderness: There is no tenderness. There is no guarding or rebound. Hernia: No hernia is present.  There

## 2020-02-10 ENCOUNTER — ANESTHESIA EVENT (OUTPATIENT)
Dept: OPERATING ROOM | Age: 60
End: 2020-02-10
Payer: COMMERCIAL

## 2020-02-11 ENCOUNTER — HOSPITAL ENCOUNTER (OUTPATIENT)
Age: 60
Setting detail: OUTPATIENT SURGERY
Discharge: HOME | End: 2020-02-11
Attending: SURGERY | Admitting: SURGERY
Payer: COMMERCIAL

## 2020-02-11 ENCOUNTER — ANESTHESIA (OUTPATIENT)
Dept: OPERATING ROOM | Age: 60
End: 2020-02-11
Payer: COMMERCIAL

## 2020-02-11 VITALS
HEART RATE: 83 BPM | BODY MASS INDEX: 29.42 KG/M2 | RESPIRATION RATE: 16 BRPM | HEIGHT: 65 IN | DIASTOLIC BLOOD PRESSURE: 60 MMHG | SYSTOLIC BLOOD PRESSURE: 126 MMHG | OXYGEN SATURATION: 97 % | TEMPERATURE: 96.9 F | WEIGHT: 176.6 LBS

## 2020-02-11 VITALS
OXYGEN SATURATION: 99 % | TEMPERATURE: 97.7 F | RESPIRATION RATE: 4 BRPM | SYSTOLIC BLOOD PRESSURE: 113 MMHG | DIASTOLIC BLOOD PRESSURE: 68 MMHG

## 2020-02-11 PROCEDURE — 7100000011 HC PHASE II RECOVERY - ADDTL 15 MIN: Performed by: SURGERY

## 2020-02-11 PROCEDURE — 2580000003 HC RX 258: Performed by: SURGERY

## 2020-02-11 PROCEDURE — 7100000010 HC PHASE II RECOVERY - FIRST 15 MIN: Performed by: SURGERY

## 2020-02-11 PROCEDURE — 3700000000 HC ANESTHESIA ATTENDED CARE: Performed by: SURGERY

## 2020-02-11 PROCEDURE — 3700000001 HC ADD 15 MINUTES (ANESTHESIA): Performed by: SURGERY

## 2020-02-11 PROCEDURE — 2500000003 HC RX 250 WO HCPCS: Performed by: SURGERY

## 2020-02-11 PROCEDURE — 3600000012 HC SURGERY LEVEL 2 ADDTL 15MIN: Performed by: SURGERY

## 2020-02-11 PROCEDURE — 2709999900 HC NON-CHARGEABLE SUPPLY: Performed by: SURGERY

## 2020-02-11 PROCEDURE — 2500000003 HC RX 250 WO HCPCS: Performed by: REGISTERED NURSE

## 2020-02-11 PROCEDURE — 6360000002 HC RX W HCPCS: Performed by: SURGERY

## 2020-02-11 PROCEDURE — 6370000000 HC RX 637 (ALT 250 FOR IP)

## 2020-02-11 PROCEDURE — 7100000000 HC PACU RECOVERY - FIRST 15 MIN: Performed by: SURGERY

## 2020-02-11 PROCEDURE — 46270 REMOVE ANAL FIST SUBQ: CPT | Performed by: SURGERY

## 2020-02-11 PROCEDURE — 6360000002 HC RX W HCPCS: Performed by: REGISTERED NURSE

## 2020-02-11 PROCEDURE — 7100000001 HC PACU RECOVERY - ADDTL 15 MIN: Performed by: SURGERY

## 2020-02-11 PROCEDURE — 3600000002 HC SURGERY LEVEL 2 BASE: Performed by: SURGERY

## 2020-02-11 RX ORDER — FENTANYL CITRATE 50 UG/ML
INJECTION, SOLUTION INTRAMUSCULAR; INTRAVENOUS PRN
Status: DISCONTINUED | OUTPATIENT
Start: 2020-02-11 | End: 2020-02-11 | Stop reason: SDUPTHER

## 2020-02-11 RX ORDER — PROMETHAZINE HYDROCHLORIDE 25 MG/ML
INJECTION, SOLUTION INTRAMUSCULAR; INTRAVENOUS
Status: DISCONTINUED
Start: 2020-02-11 | End: 2020-02-11 | Stop reason: HOSPADM

## 2020-02-11 RX ORDER — MORPHINE SULFATE 2 MG/ML
4 INJECTION, SOLUTION INTRAMUSCULAR; INTRAVENOUS
Status: DISCONTINUED | OUTPATIENT
Start: 2020-02-11 | End: 2020-02-11 | Stop reason: HOSPADM

## 2020-02-11 RX ORDER — KETOROLAC TROMETHAMINE 10 MG/1
10 TABLET, FILM COATED ORAL EVERY 8 HOURS PRN
Qty: 15 TABLET | Refills: 0 | Status: SHIPPED | OUTPATIENT
Start: 2020-02-11 | End: 2020-02-24 | Stop reason: ALTCHOICE

## 2020-02-11 RX ORDER — SODIUM CHLORIDE 0.9 % (FLUSH) 0.9 %
10 SYRINGE (ML) INJECTION PRN
Status: DISCONTINUED | OUTPATIENT
Start: 2020-02-11 | End: 2020-02-11 | Stop reason: HOSPADM

## 2020-02-11 RX ORDER — ONDANSETRON 2 MG/ML
4 INJECTION INTRAMUSCULAR; INTRAVENOUS EVERY 6 HOURS PRN
Status: DISCONTINUED | OUTPATIENT
Start: 2020-02-11 | End: 2020-02-11 | Stop reason: HOSPADM

## 2020-02-11 RX ORDER — ROCURONIUM BROMIDE 10 MG/ML
INJECTION, SOLUTION INTRAVENOUS PRN
Status: DISCONTINUED | OUTPATIENT
Start: 2020-02-11 | End: 2020-02-11 | Stop reason: SDUPTHER

## 2020-02-11 RX ORDER — PROMETHAZINE HYDROCHLORIDE 25 MG/ML
25 INJECTION, SOLUTION INTRAMUSCULAR; INTRAVENOUS ONCE
Status: DISCONTINUED | OUTPATIENT
Start: 2020-02-11 | End: 2020-02-11 | Stop reason: HOSPADM

## 2020-02-11 RX ORDER — OXYCODONE HYDROCHLORIDE 5 MG/1
TABLET ORAL
Status: COMPLETED
Start: 2020-02-11 | End: 2020-02-11

## 2020-02-11 RX ORDER — MORPHINE SULFATE 2 MG/ML
2 INJECTION, SOLUTION INTRAMUSCULAR; INTRAVENOUS
Status: DISCONTINUED | OUTPATIENT
Start: 2020-02-11 | End: 2020-02-11 | Stop reason: HOSPADM

## 2020-02-11 RX ORDER — BUPIVACAINE HYDROCHLORIDE AND EPINEPHRINE 5; 5 MG/ML; UG/ML
INJECTION, SOLUTION EPIDURAL; INTRACAUDAL; PERINEURAL PRN
Status: DISCONTINUED | OUTPATIENT
Start: 2020-02-11 | End: 2020-02-11 | Stop reason: ALTCHOICE

## 2020-02-11 RX ORDER — SODIUM CHLORIDE 9 MG/ML
INJECTION, SOLUTION INTRAVENOUS CONTINUOUS
Status: DISCONTINUED | OUTPATIENT
Start: 2020-02-11 | End: 2020-02-11 | Stop reason: HOSPADM

## 2020-02-11 RX ORDER — ONDANSETRON 2 MG/ML
INJECTION INTRAMUSCULAR; INTRAVENOUS PRN
Status: DISCONTINUED | OUTPATIENT
Start: 2020-02-11 | End: 2020-02-11 | Stop reason: SDUPTHER

## 2020-02-11 RX ORDER — CEFAZOLIN SODIUM 1 G/50ML
1 INJECTION, SOLUTION INTRAVENOUS
Status: COMPLETED | OUTPATIENT
Start: 2020-02-11 | End: 2020-02-11

## 2020-02-11 RX ORDER — MIDAZOLAM HYDROCHLORIDE 1 MG/ML
INJECTION INTRAMUSCULAR; INTRAVENOUS PRN
Status: DISCONTINUED | OUTPATIENT
Start: 2020-02-11 | End: 2020-02-11 | Stop reason: SDUPTHER

## 2020-02-11 RX ORDER — LIDOCAINE HCL/PF 100 MG/5ML
SYRINGE (ML) INJECTION PRN
Status: DISCONTINUED | OUTPATIENT
Start: 2020-02-11 | End: 2020-02-11 | Stop reason: SDUPTHER

## 2020-02-11 RX ORDER — OXYCODONE HYDROCHLORIDE 5 MG/1
5 TABLET ORAL EVERY 4 HOURS PRN
Status: DISCONTINUED | OUTPATIENT
Start: 2020-02-11 | End: 2020-02-11 | Stop reason: HOSPADM

## 2020-02-11 RX ORDER — SODIUM CHLORIDE 0.9 % (FLUSH) 0.9 %
10 SYRINGE (ML) INJECTION EVERY 12 HOURS SCHEDULED
Status: DISCONTINUED | OUTPATIENT
Start: 2020-02-11 | End: 2020-02-11 | Stop reason: HOSPADM

## 2020-02-11 RX ORDER — PROPOFOL 10 MG/ML
INJECTION, EMULSION INTRAVENOUS PRN
Status: DISCONTINUED | OUTPATIENT
Start: 2020-02-11 | End: 2020-02-11 | Stop reason: SDUPTHER

## 2020-02-11 RX ORDER — OXYCODONE HYDROCHLORIDE 5 MG/1
10 TABLET ORAL EVERY 4 HOURS PRN
Status: DISCONTINUED | OUTPATIENT
Start: 2020-02-11 | End: 2020-02-11 | Stop reason: HOSPADM

## 2020-02-11 RX ORDER — DEXAMETHASONE SODIUM PHOSPHATE 4 MG/ML
INJECTION, SOLUTION INTRA-ARTICULAR; INTRALESIONAL; INTRAMUSCULAR; INTRAVENOUS; SOFT TISSUE PRN
Status: DISCONTINUED | OUTPATIENT
Start: 2020-02-11 | End: 2020-02-11 | Stop reason: SDUPTHER

## 2020-02-11 RX ORDER — HYDROCODONE BITARTRATE AND ACETAMINOPHEN 5; 325 MG/1; MG/1
1-2 TABLET ORAL EVERY 6 HOURS PRN
Qty: 20 TABLET | Refills: 0 | Status: SHIPPED | OUTPATIENT
Start: 2020-02-11 | End: 2020-02-14

## 2020-02-11 RX ORDER — KETOROLAC TROMETHAMINE 30 MG/ML
INJECTION, SOLUTION INTRAMUSCULAR; INTRAVENOUS PRN
Status: DISCONTINUED | OUTPATIENT
Start: 2020-02-11 | End: 2020-02-11 | Stop reason: SDUPTHER

## 2020-02-11 RX ADMIN — ONDANSETRON HYDROCHLORIDE 4 MG: 4 INJECTION, SOLUTION INTRAMUSCULAR; INTRAVENOUS at 09:47

## 2020-02-11 RX ADMIN — SODIUM CHLORIDE: 9 INJECTION, SOLUTION INTRAVENOUS at 08:16

## 2020-02-11 RX ADMIN — FENTANYL CITRATE 50 MCG: 50 INJECTION, SOLUTION INTRAMUSCULAR; INTRAVENOUS at 09:43

## 2020-02-11 RX ADMIN — FENTANYL CITRATE 50 MCG: 50 INJECTION, SOLUTION INTRAMUSCULAR; INTRAVENOUS at 10:42

## 2020-02-11 RX ADMIN — ROCURONIUM BROMIDE 30 MG: 10 INJECTION INTRAVENOUS at 09:43

## 2020-02-11 RX ADMIN — OXYCODONE HYDROCHLORIDE 5 MG: 5 TABLET ORAL at 11:58

## 2020-02-11 RX ADMIN — SODIUM CHLORIDE: 9 INJECTION, SOLUTION INTRAVENOUS at 10:38

## 2020-02-11 RX ADMIN — MIDAZOLAM HYDROCHLORIDE 2 MG: 1 INJECTION, SOLUTION INTRAMUSCULAR; INTRAVENOUS at 09:40

## 2020-02-11 RX ADMIN — CEFAZOLIN SODIUM 1 G: 1 INJECTION, SOLUTION INTRAVENOUS at 09:47

## 2020-02-11 RX ADMIN — SUGAMMADEX 200 MG: 100 INJECTION, SOLUTION INTRAVENOUS at 10:32

## 2020-02-11 RX ADMIN — DEXAMETHASONE SODIUM PHOSPHATE 4 MG: 4 INJECTION, SOLUTION INTRAMUSCULAR; INTRAVENOUS at 09:47

## 2020-02-11 RX ADMIN — Medication 50 MG: at 09:43

## 2020-02-11 RX ADMIN — KETOROLAC TROMETHAMINE 30 MG: 30 INJECTION, SOLUTION INTRAMUSCULAR; INTRAVENOUS at 10:32

## 2020-02-11 RX ADMIN — FENTANYL CITRATE 50 MCG: 50 INJECTION, SOLUTION INTRAMUSCULAR; INTRAVENOUS at 10:46

## 2020-02-11 RX ADMIN — FENTANYL CITRATE 50 MCG: 50 INJECTION, SOLUTION INTRAMUSCULAR; INTRAVENOUS at 10:15

## 2020-02-11 RX ADMIN — PROPOFOL 140 MG: 10 INJECTION, EMULSION INTRAVENOUS at 09:43

## 2020-02-11 ASSESSMENT — PULMONARY FUNCTION TESTS
PIF_VALUE: 1
PIF_VALUE: 1
PIF_VALUE: 18
PIF_VALUE: 18
PIF_VALUE: 17
PIF_VALUE: 17
PIF_VALUE: 10
PIF_VALUE: 0
PIF_VALUE: 18
PIF_VALUE: 3
PIF_VALUE: 12
PIF_VALUE: 17
PIF_VALUE: 18
PIF_VALUE: 10
PIF_VALUE: 16
PIF_VALUE: 17
PIF_VALUE: 0
PIF_VALUE: 13
PIF_VALUE: 18
PIF_VALUE: 18
PIF_VALUE: 2
PIF_VALUE: 15
PIF_VALUE: 18
PIF_VALUE: 2
PIF_VALUE: 17
PIF_VALUE: 17
PIF_VALUE: 18
PIF_VALUE: 17
PIF_VALUE: 19
PIF_VALUE: 7
PIF_VALUE: 12
PIF_VALUE: 18
PIF_VALUE: 18
PIF_VALUE: 14
PIF_VALUE: 17
PIF_VALUE: 15
PIF_VALUE: 17
PIF_VALUE: 1
PIF_VALUE: 17
PIF_VALUE: 17
PIF_VALUE: 16
PIF_VALUE: 18
PIF_VALUE: 18
PIF_VALUE: 19
PIF_VALUE: 6
PIF_VALUE: 18
PIF_VALUE: 17
PIF_VALUE: 0
PIF_VALUE: 19
PIF_VALUE: 18
PIF_VALUE: 6
PIF_VALUE: 19

## 2020-02-11 ASSESSMENT — PAIN SCALES - GENERAL
PAINLEVEL_OUTOF10: 3
PAINLEVEL_OUTOF10: 2
PAINLEVEL_OUTOF10: 4
PAINLEVEL_OUTOF10: 7
PAINLEVEL_OUTOF10: 4
PAINLEVEL_OUTOF10: 7

## 2020-02-11 ASSESSMENT — PAIN DESCRIPTION - DESCRIPTORS: DESCRIPTORS: ACHING

## 2020-02-11 ASSESSMENT — PAIN - FUNCTIONAL ASSESSMENT: PAIN_FUNCTIONAL_ASSESSMENT: 0-10

## 2020-02-11 NOTE — PROGRESS NOTES
[phone] Pt arouses to name on arrival to PACU , [facility] elevated, pt c/o pain medicated with fentanyl per Verenice Elizalde CRNA  1050 pt states she is having  Abdominal cramping and nausea   1055 medicated with phenergan and light dimmed per request   1110 eyes closed resp easy, O2 off  1115 awakens on own states nausea improved but having some pain in rectal area , drifts back to sleep easily   1130 resting resp easy   1140 awakens easily to name , states pain tolerable and nausea gone   1148 meets criteria for discharge , transported to [facility]

## 2020-02-11 NOTE — PROGRESS NOTES
Pt up to bathroom was able to urinate. Pt got dizzy with walking laid her back down. Pt was advised to put light on when wanting to continue to get dressed.

## 2020-02-11 NOTE — PROGRESS NOTES
Pt and  updated that Dr [facility]KAR[facility] is still in first procedure. He is expecting it to last an additional 15-20 minutes and then will come speak with them. No further needs voiced at this time.

## 2020-02-11 NOTE — PROGRESS NOTES
Warm blanket given for comfort and pt assisted to reposition in the bed. No other needs voiced at this time.

## 2020-02-11 NOTE — INTERVAL H&P NOTE
H&P Update    Patient's History and Physical was reviewed. Patient examined. There has been no change.     Electronically signed by Roxane Gallo MD on 2/11/20 at 9:50 AM

## 2020-02-11 NOTE — PROGRESS NOTES
Pt and family oriented to SDS 7 and unit. Pt verbalized approval for first name, last initial and physician name on unit whiteboard. Fall band applied. Vaccine information given. Plan of care reviewed.

## 2020-02-11 NOTE — PROGRESS NOTES
out to desk upset and voicing concerns about delay in procedure time. Advised that pt is Dr Brett Hays 2nd case and is tentatively scheduled for 9:20. He will be leaving the department and asked for a call when she goes into surgery.

## 2020-02-11 NOTE — PROGRESS NOTES
Patient and son  stated understanding discharge instructions. prescriptions given to patients on per paitent.   Assessment of Surgical site no drainage

## 2020-02-12 ENCOUNTER — TELEPHONE (OUTPATIENT)
Dept: SURGERY | Age: 60
End: 2020-02-12

## 2020-02-12 RX ORDER — DIBUCAINE 0.28 G/28G
OINTMENT TOPICAL
Qty: 1 TUBE | Refills: 3 | Status: SHIPPED | OUTPATIENT
Start: 2020-02-12 | End: 2020-10-08 | Stop reason: ALTCHOICE

## 2020-02-12 NOTE — OP NOTE
identified. The  patient was easily then brought out of general anesthesia and  transferred back to the postanesthesia care unit in stable condition. Less than 5 mL blood loss.         Adeel Yeung M.D.    D: 02/11/2020 10:35:47       T: 02/11/2020 11:36:13     [email or web address]  Job#: [id number]     Doc#: [id number]    CC:

## 2020-02-12 NOTE — TELEPHONE ENCOUNTER
Spoke with pt and let her know that Dr. Georgina Alvarez sent prescription Dibucaine into her pharmacy.

## 2020-02-13 NOTE — PROGRESS NOTES
CLINICAL PHARMACY NOTE: MEDS  [address] Select Patient?: No  Total # of Prescriptions Filled: 2   The following medications were delivered to the patient:  Ketorolac 10mg  Hydrocodone-APAP 5/325mg  Total # of Interventions Completed: 2  Time Spent (min): 30    Additional Documentation:

## 2020-02-17 ENCOUNTER — TELEPHONE (OUTPATIENT)
Dept: SURGERY | Age: 60
End: 2020-02-17

## 2020-02-17 RX ORDER — SULFAMETHOXAZOLE AND TRIMETHOPRIM 800; 160 MG/1; MG/1
1 TABLET ORAL 2 TIMES DAILY
Qty: 14 TABLET | Refills: 0 | Status: SHIPPED | OUTPATIENT
Start: 2020-02-17 | End: 2020-02-24 | Stop reason: ALTCHOICE

## 2020-02-17 NOTE — TELEPHONE ENCOUNTER
Yes pain and discharge is still all normal at this time. We can put her on a prophylactic antibiotic until appointment. If she is concerned and wants earlier appointment that is fine too.

## 2020-02-24 ENCOUNTER — OFFICE VISIT (OUTPATIENT)
Dept: SURGERY | Age: 60
End: 2020-02-24

## 2020-02-24 VITALS
HEIGHT: 65 IN | BODY MASS INDEX: 29.82 KG/M2 | RESPIRATION RATE: 18 BRPM | WEIGHT: 179 LBS | SYSTOLIC BLOOD PRESSURE: 118 MMHG | HEART RATE: 74 BPM | TEMPERATURE: 97.4 F | DIASTOLIC BLOOD PRESSURE: 74 MMHG | OXYGEN SATURATION: 98 %

## 2020-02-24 PROCEDURE — 99024 POSTOP FOLLOW-UP VISIT: CPT | Performed by: NURSE PRACTITIONER

## 2020-02-24 ASSESSMENT — ENCOUNTER SYMPTOMS
PHOTOPHOBIA: 0
BLOOD IN STOOL: 0
CONSTIPATION: 0
ABDOMINAL DISTENTION: 0
EYE ITCHING: 0
ABDOMINAL PAIN: 0
STRIDOR: 0
CHOKING: 0
VOICE CHANGE: 0
SHORTNESS OF BREATH: 0
TROUBLE SWALLOWING: 0
CHEST TIGHTNESS: 0
BACK PAIN: 0
SINUS PRESSURE: 0
EYE DISCHARGE: 0
FACIAL SWELLING: 0
NAUSEA: 0
VOMITING: 0
ANAL BLEEDING: 0
APNEA: 0
COLOR CHANGE: 0
WHEEZING: 0
EYE REDNESS: 0
DIARRHEA: 0
RHINORRHEA: 0
EYE PAIN: 0
COUGH: 0
SORE THROAT: 0

## 2020-02-24 NOTE — PROGRESS NOTES
sounds: S1 normal and S2 normal.   Pulmonary:      Effort: Pulmonary effort is normal. No tachypnea, bradypnea, accessory muscle usage or respiratory distress. Breath sounds: Normal breath sounds. No decreased breath sounds, wheezing or rales. Chest:      Chest wall: No tenderness. Abdominal:      General: Bowel sounds are normal. There is no distension. Palpations: Abdomen is soft. There is no mass. Tenderness: There is no abdominal tenderness. Genitourinary:      Musculoskeletal: Normal range of motion. General: No tenderness. Skin:     General: Skin is warm and dry. Findings: No abrasion, bruising, burn, ecchymosis, erythema, laceration, lesion or rash. Neurological:      Mental Status: She is alert and oriented to person, place, and time. Motor: No tremor, atrophy or abnormal muscle tone. Coordination: Coordination normal.      Gait: Gait normal.      Deep Tendon Reflexes: Reflexes are normal and symmetric. Psychiatric:         Speech: Speech normal.         Behavior: Behavior normal.         Thought Content: Thought content normal.        Patient Active Problem List   Diagnosis    Lactose intolerance    Iron deficiency anemia    Diplopia    Osteoporosis    Hypothyroidism    Gastroesophageal reflux disease    Anal fistula     Assessment:      1. Status post AEUA and anal fistulotomy     Plan:     1. Incision seems to be healing well. No signs of infection. Drainage as expected. 2. Continue wound care, sitz baths and dibucaine cream as directed  3. Activity restrictions discussed  4. Stool softeners as needed  5. Pain control with Tylenol/Motrin as needed  6. Follow up in 2 weeks. Signs and symptoms reviewed with patient that would be concerning and need her to return to office for re-evaluation. Patient states she will call if she has questions or concerns.       Electronically signed by SONNY Figueredo CNP on 2/27/2020 at 2:00 PM

## 2020-02-27 ASSESSMENT — ENCOUNTER SYMPTOMS: RECTAL PAIN: 1

## 2020-07-23 LAB
CHOLESTEROL, TOTAL: 142 MG/DL
CHOLESTEROL/HDL RATIO: ABNORMAL
HDLC SERPL-MCNC: 72 MG/DL (ref 35–70)
LDL CHOLESTEROL CALCULATED: 54 MG/DL (ref 0–160)
TRIGL SERPL-MCNC: 79 MG/DL
VLDLC SERPL CALC-MCNC: ABNORMAL MG/DL

## 2020-07-28 RX ORDER — LEVOTHYROXINE SODIUM 0.07 MG/1
TABLET ORAL
Qty: 90 TABLET | Refills: 0 | Status: SHIPPED | OUTPATIENT
Start: 2020-07-28 | End: 2020-10-30 | Stop reason: SDUPTHER

## 2020-07-28 NOTE — TELEPHONE ENCOUNTER
Last seen 8/5/19 Next appt 8/13/20.    Pt also called for refill, she had labs done, she has a copy and will fax the results  Order pending for med refill

## 2020-10-07 NOTE — PROGRESS NOTES
[facility]  [facility]  Dept: [phone]  Dept Fax: [phone]    [facility] Ena Lux is a 64 y. o.female    Pt presents for follow up of Hypothyroidism, Anemia, Osteopenia, GERD. Pt feeling ok since last visit- interval history and any new issues noted below:     Pt had EGD/ Colonoscopy done per / Bria Augustin- in 12/2019. Anemia most likely due to previous Haris-En- Y bypass. Pt referred to Dr. Edmond Stone for Anal Fissure/ Fistula- Fistulotomy performed 2/11/2020. Pt to see Dr. Bria Augustin again in near future due to increased frequency of BM's and occasional and abdominal cramping. No diarrhea. No melena/ hematochezia. No different with specific foods. Pt continues seeing Dr. Cornel Murcia for Iron Deficiency Anemia. Pt complains of 6-9 month history of sore throat, associated with post-nasal drip. Occasional sneezing.  + Itchy Eyes.  + Rhinorrhea. Pt concerned re Hashimoto's Disease due to daughter diagnosed with this previously. Pt complains of tenderness on skin- \"as long as no one is touching my flesh, I am ok\"     Wt Readings from Last 3 Encounters:   10/08/20 183 lb 9.6 oz (83.3 kg)   02/24/20 179 lb (81.2 kg)   02/11/20 176 lb 9.6 oz (80.1 kg)   Weight increased 9# since last visit 14 months ago.      Patient Active Problem List   Diagnosis    Lactose intolerance    Iron deficiency anemia    Diplopia    Osteoporosis    Hypothyroidism    Gastroesophageal reflux disease    Anal fistula       Current Outpatient Medications   Medication Sig Dispense Refill    Cholecalciferol (VITAMIN D) 50 MCG (2000 UT) CAPS capsule Take 10,000 Units by mouth daily      TURMERIC PO Take 1 tablet by mouth daily      estradiol (ESTRACE) 0.1 MG/GM vaginal cream INSERT 1 GRAM BY VAGINAL ROUTE DAILY FOR 14 DAYS THEN 3 TIMES A WEEK THEREAFTER      levothyroxine (EUTHYROX) 75 MCG tablet Take 1 tablet by mouth once daily 90 tablet 0    CALCIUM-VITAMIN D PO Take by mouth daily      esomeprazole Magnesium (NEXIUM) 40 MG PACK Take 40 mg by mouth as needed       Multiple Vitamin (MULTIVITAMIN PO) Take  by mouth daily. No current facility-administered medications for this visit. Review of Systems   Constitutional: Positive for chills (occasional). Negative for diaphoresis, fatigue, fever and unexpected weight change. Eyes: Negative for visual disturbance. Respiratory: Negative for chest tightness and shortness of breath. Cardiovascular: Positive for palpitations (rare). Negative for chest pain and leg swelling. Gastrointestinal: Negative for abdominal pain, anal bleeding, blood in stool, constipation, diarrhea (occasional soft BM's), nausea and vomiting. Genitourinary: Positive for urgency. Negative for dysuria and hematuria. Musculoskeletal: Positive for arthralgias ( Jaw pain- seeing Dr. Daja Gonzales in [place], [place]) and myalgias. Negative for neck pain. Neurological: Positive for headaches (occasional). Negative for dizziness and light-headedness. OBJECTIVE     /74   Pulse 64   Temp 97.2 °F (36.2 °C) (Temporal)   Resp 16   Ht 5' 5\" (1.651 m)   Wt 183 lb 9.6 oz (83.3 kg)   BMI 30.55 kg/m²   Body mass index is 30.55 kg/m². BP Readings from Last 3 Encounters:   10/08/20 112/74   02/24/20 118/74   02/11/20 113/68         Physical Exam  Vitals signs and nursing note reviewed. Constitutional:       General: She is not in acute distress. Appearance: Normal appearance. She is normal weight. She is not ill-appearing, toxic-appearing or diaphoretic. HENT:      Head: Normocephalic and atraumatic. Right Ear: External ear normal.      Left Ear: External ear normal.      Nose: No rhinorrhea. Mouth/Throat:      Mouth: Mucous membranes are moist.      Pharynx: Oropharynx is clear. Eyes:      General: No scleral icterus. Right eye: No discharge. Left eye: No discharge.       Extraocular Movements: Extraocular movements intact. Conjunctiva/sclera: Conjunctivae normal.      Pupils: Pupils are equal, round, and reactive to light. Neck:      Musculoskeletal: Normal range of motion. Cardiovascular:      Rate and Rhythm: Normal rate and regular rhythm. Heart sounds: Normal heart sounds. No murmur. No friction rub. No gallop. Pulmonary:      Effort: Pulmonary effort is normal. No respiratory distress. Breath sounds: Normal breath sounds. No stridor. No wheezing, rhonchi or rales. Chest:      Chest wall: No tenderness. Abdominal:      General: Abdomen is flat. Bowel sounds are normal. There is no distension. Palpations: Abdomen is soft. There is no mass. Tenderness: There is no abdominal tenderness. There is no guarding or rebound. Hernia: No hernia is present. Musculoskeletal: Normal range of motion. General: No swelling, deformity or signs of injury. Skin:     General: Skin is warm and dry. Coloration: Skin is not jaundiced or pale. Findings: No bruising, erythema, lesion or rash. Neurological:      General: No focal deficit present. Mental Status: She is alert and oriented to person, place, and time. Mental status is at baseline. Motor: No weakness. Coordination: Coordination normal.   Psychiatric:         Mood and Affect: Mood normal.         Behavior: Behavior normal.         Thought Content:  Thought content normal.         Judgment: Judgment normal.                           Lab Results   Component Value Date    LABA1C 5.2 02/25/2016       Lab Results   Component Value Date    CHOL 142 07/23/2020    TRIG 79 07/23/2020    HDL 72 (A) 07/23/2020    LDLCALC 54 07/23/2020       The ASCVD Risk score (Renee Forman., et al., 2013) failed to calculate for the following reasons:    Unable to determine if patient is Non-     Lab Results   Component Value Date     04/26/2018    K 4.7 04/26/2018     04/26/2018    CO2 26 04/26/2018    BUN 17 04/26/2018    CREATININE 0.7 04/26/2018    GLUCOSE 80 04/26/2018    CALCIUM 8.8 04/26/2018    PROT 6.6 04/26/2018    LABALBU 4.3 04/26/2018    BILITOT 0.5 04/26/2018    ALKPHOS 77 04/26/2018    AST 17 04/26/2018    ALT 20 04/26/2018    LABGLOM >90 04/18/2017       No results found for: LABMICR, [id number]    Lab Results   Component Value Date    TSH 2.63 02/04/2020    T4FREE 1.44 07/06/2018       Lab Results   Component Value Date    WBC 6.8 04/18/2017    HGB 11.9 02/04/2020    HCT 36.1 02/04/2020    MCV 87.6 04/18/2017     04/18/2017       No results found for: PSA, PSADIA    Immunization History   Administered Date(s) Administered    Meningococcal MCV4P (Menactra) 09/09/2013    Tdap (Boostrix, Adacel) 05/08/2020       Health Maintenance   Topic Date Due    Shingles Vaccine (1 of 2) 09/01/2012    Cervical cancer screen  05/01/2016    Colon cancer screen colonoscopy  06/13/2017    Flu vaccine (1) 10/08/2021 (Originally 9/1/2020)    TSH testing  07/23/2021    Breast cancer screen  12/18/2021    Lipid screen  07/23/2025    DTaP/Tdap/Td vaccine (2 - Td) 05/08/2030    Hepatitis A vaccine  Aged Out    Hepatitis B vaccine  Aged Out    Hib vaccine  Aged Out    Meningococcal (ACWY) vaccine  Aged Out    Pneumococcal 0-64 years Vaccine  Aged Out    Hepatitis C screen  Discontinued    HIV screen  Discontinued       AAA ultrasound (Male, 65-75, smoked ever) indicated at this time? No tobacco history  CT Lung Screen (55-80, 30 pk-yrs, smoking or quit <15 years) indicated at this time? No tobacco history    Future Appointments   Date Time Provider Melecio Díazi   4/8/2021  9:15 AM Xander Guillen MD [facility]       ASSESSMENT       Diagnosis Orders   1. Hypothyroidism, unspecified type  T4, Free    TSH without Reflex    Thyroid Peroxidase Antibody   2.  Iron deficiency anemia, unspecified iron deficiency anemia type  CBC Auto Differential    Sedimentation Rate C-Reactive Protein   3. Osteoporosis without current pathological fracture, unspecified osteoporosis type     4. Gastroesophageal reflux disease, unspecified whether esophagitis present     5. Anal fistula     6. Stage 3a chronic kidney disease  BUN    Creatinine, Serum   7. Myalgia     8. Vitamin D deficiency  Vitamin D 25 Hydroxy   9. Urinary frequency  Urinalysis with Reflex Culture   10. B12 deficiency  Vitamin B12 & Folate       PLAN      Encouraged increased Dietary Fiber or supplement FiberCon OTC as needed  Encouraged increased water intake, drinking > 80 ounces of water daily. Encouraged regular exercise- 30 minutes 5x/ week   After discussion with pt, will start Claritin 10 mg- 1 pill daily. CBC management per Dr. Crystal Acharya. Check BUN/CR, FREE T4/ TSH, TPO antibodies, CBC, ESR, CRP, and VIT D 25 OH, B12/ Folate, UA with reflex C&S at this time. Follow up in 6-12 months.          Preventive Health Topics:  Encouraged annual FLU VACCINE- pt declines. (updated 10/8/2020)  Encouraged NEW SHINGLES SHOT [facility]) - check with insurance re coverage and location of administration. (Doctors office vs local pharmacy)  PAP/ PELVIC management per Dr. Xi Gastelum to follow up 11/2020 (updated 10/8/2020)  MAMMO due after 12/18/2020  COLONOSCOPY done 12/2/2019 per Dr. Amy Cook- to do again in 2029.  (updated 10/8/2020)   EGD done 12/2/2019 per Dr. Amy Cook- GERD, Inactive Gastritis- to follow up PRN. Anemia most likely due to previous Haris-En- Y Bypass.   (updated 10/8/2020)  DEXA done 11/19/2019 per Dr. Radha Capellan- Osteopenia with negative FRAX- to continue Calcium with Vitamin D supplementation and weight bearing exercise. (updated 10/8/2020)            Electronically signed by Rajinder Layton MD on 10/8/2020 at 12:23 PM

## 2020-10-08 ENCOUNTER — OFFICE VISIT (OUTPATIENT)
Dept: FAMILY MEDICINE CLINIC | Age: 60
End: 2020-10-08
Payer: COMMERCIAL

## 2020-10-08 VITALS
RESPIRATION RATE: 16 BRPM | SYSTOLIC BLOOD PRESSURE: 112 MMHG | TEMPERATURE: 97.2 F | HEART RATE: 64 BPM | BODY MASS INDEX: 30.59 KG/M2 | DIASTOLIC BLOOD PRESSURE: 74 MMHG | HEIGHT: 65 IN | WEIGHT: 183.6 LBS

## 2020-10-08 PROCEDURE — 1036F TOBACCO NON-USER: CPT | Performed by: FAMILY MEDICINE

## 2020-10-08 PROCEDURE — G8417 CALC BMI ABV UP PARAM F/U: HCPCS | Performed by: FAMILY MEDICINE

## 2020-10-08 PROCEDURE — 3017F COLORECTAL CA SCREEN DOC REV: CPT | Performed by: FAMILY MEDICINE

## 2020-10-08 PROCEDURE — G8484 FLU IMMUNIZE NO ADMIN: HCPCS | Performed by: FAMILY MEDICINE

## 2020-10-08 PROCEDURE — 99214 OFFICE O/P EST MOD 30 MIN: CPT | Performed by: FAMILY MEDICINE

## 2020-10-08 PROCEDURE — G8427 DOCREV CUR MEDS BY ELIG CLIN: HCPCS | Performed by: FAMILY MEDICINE

## 2020-10-08 RX ORDER — ESTRADIOL 0.1 MG/G
CREAM VAGINAL
COMMUNITY
Start: 2020-08-11

## 2020-10-08 RX ORDER — LEVOTHYROXINE SODIUM 0.07 MG/1
TABLET ORAL
Qty: 90 TABLET | Refills: 3 | Status: CANCELLED | OUTPATIENT
Start: 2020-10-08

## 2020-10-08 RX ORDER — MULTIVIT-MIN/IRON/FOLIC ACID/K 18-600-40
6000 CAPSULE ORAL DAILY
COMMUNITY
End: 2021-03-16 | Stop reason: DRUGHIGH

## 2020-10-08 ASSESSMENT — PATIENT HEALTH QUESTIONNAIRE - PHQ9
1. LITTLE INTEREST OR PLEASURE IN DOING THINGS: 0
SUM OF ALL RESPONSES TO PHQ9 QUESTIONS 1 & 2: 0
SUM OF ALL RESPONSES TO PHQ QUESTIONS 1-9: 0
2. FEELING DOWN, DEPRESSED OR HOPELESS: 0
SUM OF ALL RESPONSES TO PHQ QUESTIONS 1-9: 0

## 2020-10-08 ASSESSMENT — ENCOUNTER SYMPTOMS
CONSTIPATION: 0
ANAL BLEEDING: 0
SHORTNESS OF BREATH: 0
ABDOMINAL PAIN: 0
VOMITING: 0
DIARRHEA: 0
BLOOD IN STOOL: 0
NAUSEA: 0
CHEST TIGHTNESS: 0

## 2020-10-08 NOTE — PATIENT INSTRUCTIONS
Encouraged increased Dietary Fiber or supplement FiberCon OTC as needed  Encouraged increased water intake, drinking > 80 ounces of water daily. Encouraged regular exercise- 30 minutes 5x/ week   After discussion with pt, will start Claritin 10 mg- 1 pill daily. CBC management per Dr. Jesi Fountain. Check BUN/CR, FREE T4/ TSH, TPO antibodies, CBC, ESR, CRP, and VIT D 25 OH, B12/ Folate, UA with reflex C&S at this time. Follow up in 6-12 months.          Preventive Health Topics:  Encouraged annual FLU VACCINE- pt declines. (updated 10/8/2020)  Encouraged NEW SHINGLES SHOT [facility]) - check with insurance re coverage and location of administration. (Doctors office vs local pharmacy)  PAP/ PELVIC management per Dr. Joao Gordon to follow up 11/2020 (updated 10/8/2020)  MAMMO due after 12/18/2020  COLONOSCOPY done 12/2/2019 per Dr. Iman Jones- to do again in 2029.  (updated 10/8/2020)   EGD done 12/2/2019 per Dr. Iman Jones- GERD, Inactive Gastritis- to follow up PRN. Anemia most likely due to previous Haris-En- Y Bypass.   (updated 10/8/2020)  DEXA done 11/19/2019 per Dr. Alix Roberson- Osteopenia with negative FRAX- to continue Calcium with Vitamin D supplementation and weight bearing exercise. (updated 10/8/2020)

## 2020-10-29 ENCOUNTER — NURSE ONLY (OUTPATIENT)
Dept: LAB | Age: 60
End: 2020-10-29

## 2020-10-29 LAB
BACTERIA: ABNORMAL /HPF
BASOPHILS # BLD: 0.5 %
BASOPHILS ABSOLUTE: 0 THOU/MM3 (ref 0–0.1)
BILIRUBIN URINE: NEGATIVE
BLOOD, URINE: ABNORMAL
BUN BLDV-MCNC: 16 MG/DL (ref 7–22)
C-REACTIVE PROTEIN: < 0.03 MG/DL (ref 0–1)
CASTS 2: ABNORMAL /LPF
CASTS UA: ABNORMAL /LPF
CHARACTER, URINE: CLEAR
COLOR: YELLOW
CREAT SERPL-MCNC: 0.7 MG/DL (ref 0.4–1.2)
CRYSTALS, UA: ABNORMAL
EOSINOPHIL # BLD: 1.2 %
EOSINOPHILS ABSOLUTE: 0.1 THOU/MM3 (ref 0–0.4)
EPITHELIAL CELLS, UA: ABNORMAL /HPF
ERYTHROCYTE [DISTWIDTH] IN BLOOD BY AUTOMATED COUNT: 13.4 % (ref 11.5–14.5)
ERYTHROCYTE [DISTWIDTH] IN BLOOD BY AUTOMATED COUNT: 45.7 FL (ref 35–45)
FOLATE: > 20 NG/ML (ref 4.8–24.2)
GFR SERPL CREATININE-BSD FRML MDRD: 86 ML/MIN/1.73M2
GLUCOSE URINE: NEGATIVE MG/DL
HCT VFR BLD CALC: 37.3 % (ref 37–47)
HEMOGLOBIN: 11.4 GM/DL (ref 12–16)
IMMATURE GRANS (ABS): 0.01 THOU/MM3 (ref 0–0.07)
IMMATURE GRANULOCYTES: 0.2 %
KETONES, URINE: NEGATIVE
LEUKOCYTE ESTERASE, URINE: ABNORMAL
LYMPHOCYTES # BLD: 35.1 %
LYMPHOCYTES ABSOLUTE: 2.3 THOU/MM3 (ref 1–4.8)
MCH RBC QN AUTO: 28.4 PG (ref 26–33)
MCHC RBC AUTO-ENTMCNC: 30.6 GM/DL (ref 32.2–35.5)
MCV RBC AUTO: 92.8 FL (ref 81–99)
MISCELLANEOUS 2: ABNORMAL
MONOCYTES # BLD: 9.7 %
MONOCYTES ABSOLUTE: 0.6 THOU/MM3 (ref 0.4–1.3)
NITRITE, URINE: NEGATIVE
NUCLEATED RED BLOOD CELLS: 0 /100 WBC
PH UA: 5.5 (ref 5–9)
PLATELET # BLD: 241 THOU/MM3 (ref 130–400)
PMV BLD AUTO: 10.8 FL (ref 9.4–12.4)
PROTEIN UA: NEGATIVE
RBC # BLD: 4.02 MILL/MM3 (ref 4.2–5.4)
RBC URINE: ABNORMAL /HPF
RENAL EPITHELIAL, UA: ABNORMAL
SEDIMENTATION RATE, ERYTHROCYTE: 12 MM/HR (ref 0–20)
SEG NEUTROPHILS: 53.3 %
SEGMENTED NEUTROPHILS ABSOLUTE COUNT: 3.5 THOU/MM3 (ref 1.8–7.7)
SPECIFIC GRAVITY, URINE: 1.01 (ref 1–1.03)
T4 FREE: 1.43 NG/DL (ref 0.93–1.76)
TSH SERPL DL<=0.05 MIU/L-ACNC: 2.69 UIU/ML (ref 0.4–4.2)
UROBILINOGEN, URINE: 0.2 EU/DL (ref 0–1)
VITAMIN B-12: 813 PG/ML (ref 211–911)
VITAMIN D 25-HYDROXY: 14 NG/ML (ref 30–100)
WBC # BLD: 6.5 THOU/MM3 (ref 4.8–10.8)
WBC UA: ABNORMAL /HPF
YEAST: ABNORMAL

## 2020-10-30 ENCOUNTER — TELEPHONE (OUTPATIENT)
Dept: FAMILY MEDICINE CLINIC | Age: 60
End: 2020-10-30

## 2020-10-30 RX ORDER — LEVOTHYROXINE SODIUM 0.07 MG/1
TABLET ORAL
Qty: 90 TABLET | Refills: 3 | Status: SHIPPED | OUTPATIENT
Start: 2020-10-30 | End: 2021-10-07

## 2020-10-30 NOTE — TELEPHONE ENCOUNTER
Results discussed with patient. She states that at her last OV, you instructed her to decrease her dose of Vit D to 2,000 units qd. She has been on this dose since OV. Patient states that she see Dr. Gavino Mcfarland for iron deficient anemia and that he manages those labs. Regarding the UA, I contacted the lab and they state that based on the lab's new criteria, it was not indicated to culture. Patient would need to submit a new specimen. Lastly,patient will need a refill of her thyroid medication. Order pended and set to escribe.

## 2020-10-30 NOTE — TELEPHONE ENCOUNTER
Sorry for any confusion, increase VIT D3 to 6,000 units daily. Noted re Dr. Sundeep Prater. Recheck VIT D 25 OH and UA with reflex C&S in 6 weeks   Thyroid med refilled.   ES

## 2020-10-30 NOTE — TELEPHONE ENCOUNTER
----- Message from Mckenzie Perdomo MD sent at 10/29/2020  8:22 PM EDT -----  Notify pt-   Results reviewed. UA negative, except trace blood and small LE- please confirm culture in process. ESR normal/ CRP normal.  VIT D low at 14- please confirm pt taking VIT D3 and current dose. B12 and Folate ok  TSH/ FREE T4 ok  BUN/CR ok  CBC shows mild anemia- will check Anemia Profile   Let me know re other questions.   ES

## 2020-11-02 LAB — THYROID PEROXIDASE ANTIBODY: 731 IU/ML (ref 0–35)

## 2020-11-04 ENCOUNTER — TELEPHONE (OUTPATIENT)
Dept: FAMILY MEDICINE CLINIC | Age: 60
End: 2020-11-04

## 2020-11-04 NOTE — TELEPHONE ENCOUNTER
----- Message from Yeni Villavicencio MD sent at 11/4/2020  8:05 AM EST -----  Call pt- TPO antibodies elevated (likely related to Hashimoto's Thyroiditis)   Recent TFT's normal.    Refer to Dr. Malik Joshua in [facility] for further evaluation.   ES

## 2020-11-04 NOTE — TELEPHONE ENCOUNTER
1) Typically, Endocrinology is who manages Thyroid issues and Dr. Dominique Martin is great at this! 2) Noted. Ok to check WITH Urine Culture at this time- ok to order separately if needed to assure both are done.   ES

## 2020-11-04 NOTE — TELEPHONE ENCOUNTER
Patient notified  [id number] Cristy Muse for referral. Order printed and will fax      Pt has a couple questions  1) why endocrinology and not rheumatology? Her rheumatology doctor is who found her thyroid issues to begin with   2) pt asked about her urine test, this was handled 11/2/20. But pt did not know the answer, see phone message 10/30/20. Pt does not understand why no cx when UA showed something. Tried to explain to pt it was only trace blood and leuks so he just wanted recheck UA done in 6 weeks, pt states she has some burning sensation and right side pain.

## 2020-11-11 ENCOUNTER — NURSE ONLY (OUTPATIENT)
Dept: LAB | Age: 60
End: 2020-11-11

## 2020-11-13 ENCOUNTER — HOSPITAL ENCOUNTER (OUTPATIENT)
Dept: CT IMAGING | Age: 60
Discharge: HOME | End: 2020-11-13
Payer: COMMERCIAL

## 2020-11-13 LAB
ORGANISM: ABNORMAL
URINE CULTURE, ROUTINE: ABNORMAL

## 2020-11-13 PROCEDURE — 6360000004 HC RX CONTRAST MEDICATION: Performed by: INTERNAL MEDICINE

## 2020-11-13 PROCEDURE — 70496 CT ANGIOGRAPHY HEAD: CPT

## 2020-11-13 RX ADMIN — IOPAMIDOL 85 ML: 755 INJECTION, SOLUTION INTRAVENOUS at 13:45

## 2020-12-21 ENCOUNTER — HOSPITAL ENCOUNTER (OUTPATIENT)
Age: 60
Setting detail: SPECIMEN
Discharge: HOME | End: 2020-12-21
Payer: COMMERCIAL

## 2020-12-21 ENCOUNTER — HOSPITAL ENCOUNTER (OUTPATIENT)
Dept: WOMENS IMAGING | Age: 60
Discharge: HOME | End: 2020-12-21
Payer: COMMERCIAL

## 2020-12-21 ENCOUNTER — OFFICE VISIT (OUTPATIENT)
Dept: RHEUMATOLOGY | Age: 60
End: 2020-12-21
Payer: COMMERCIAL

## 2020-12-21 VITALS
HEART RATE: 67 BPM | HEIGHT: 65 IN | WEIGHT: 185.4 LBS | OXYGEN SATURATION: 99 % | TEMPERATURE: 96.8 F | BODY MASS INDEX: 30.89 KG/M2 | SYSTOLIC BLOOD PRESSURE: 106 MMHG | DIASTOLIC BLOOD PRESSURE: 68 MMHG

## 2020-12-21 PROCEDURE — 77063 BREAST TOMOSYNTHESIS BI: CPT

## 2020-12-21 PROCEDURE — G8427 DOCREV CUR MEDS BY ELIG CLIN: HCPCS | Performed by: INTERNAL MEDICINE

## 2020-12-21 PROCEDURE — 1036F TOBACCO NON-USER: CPT | Performed by: INTERNAL MEDICINE

## 2020-12-21 PROCEDURE — U0003 INFECTIOUS AGENT DETECTION BY NUCLEIC ACID (DNA OR RNA); SEVERE ACUTE RESPIRATORY SYNDROME CORONAVIRUS 2 (SARS-COV-2) (CORONAVIRUS DISEASE [COVID-19]), AMPLIFIED PROBE TECHNIQUE, MAKING USE OF HIGH THROUGHPUT TECHNOLOGIES AS DESCRIBED BY CMS-2020-01-R: HCPCS

## 2020-12-21 PROCEDURE — 3017F COLORECTAL CA SCREEN DOC REV: CPT | Performed by: INTERNAL MEDICINE

## 2020-12-21 PROCEDURE — G8417 CALC BMI ABV UP PARAM F/U: HCPCS | Performed by: INTERNAL MEDICINE

## 2020-12-21 PROCEDURE — 99203 OFFICE O/P NEW LOW 30 MIN: CPT | Performed by: INTERNAL MEDICINE

## 2020-12-21 PROCEDURE — G8484 FLU IMMUNIZE NO ADMIN: HCPCS | Performed by: INTERNAL MEDICINE

## 2020-12-21 ASSESSMENT — ENCOUNTER SYMPTOMS
BLOOD IN STOOL: 0
BLURRED VISION: 0
HEARTBURN: 1
EYE PAIN: 0
EYES NEGATIVE: 1
DOUBLE VISION: 0
EYE REDNESS: 0
CONSTIPATION: 1
NAUSEA: 0
VOMITING: 0
DIARRHEA: 0
RESPIRATORY NEGATIVE: 1

## 2020-12-21 NOTE — PROGRESS NOTES
[id number] [id number]  Rheumatology Adult Consult/Referral Note       12/21/2020  MRN: [id number]        HPI:   Briana Sanabria  is a(n)60 y.o. female with a hx of Iron def anemia, hypothyroidism, Vitamin d def, osteoporosis (t-score -2.6 in 8/2014)  referred by Dr. Jeff Mancia for evaluation of her arthralgias and myalgiasa. Last seen November 2017. Reports continued reports continued myofascial pain and hypersensitivity to touch. Arthralgia - bilatealr hands, wrists, eblwos, shoulder, hips, kenes, ankles, toes. , neck and back. Pain up to 6/10 over the past week. Alleviating  tumeric daily ( some relief of the pain) and prn ibuprofen  Aggravating: consumption of tomatos, chicken. Weather changes. Pain worsening with lifting/grasping (left greater than right.  + Gelling. AM stiffness lasting about 15 minutes. Changes in the right 1st MCP. With a bump overlying the regin for the past 4-5 days. Weakness reported of the left hand with grasping after hyperextension of thumb approximately 6 months ago. 15 min of morning stiffness. Dry mouth, improve with jacque candies, gum, fluid intake. Dry eyes improved with topical eye drops prn. Runny nose. ROS:  Review of Systems   Constitutional: Positive for malaise/fatigue. Negative for fever. HENT: Positive for congestion. Negative for hearing loss and nosebleeds. Eyes: Negative. Negative for blurred vision, double vision, pain and redness. Respiratory: Negative. Cardiovascular: Positive for palpitations (mainly while tired). Negative for chest pain. Gastrointestinal: Positive for constipation and heartburn. Negative for blood in stool, diarrhea, nausea and vomiting. Genitourinary: Negative. Negative for hematuria. Musculoskeletal: Positive for myalgias. Skin: Negative for rash. Neurological: Positive for tingling. Negative for dizziness, weakness and headaches. Endo/Heme/Allergies: Negative. Psychiatric/Behavioral: Negative for depression. The patient is not nervous/anxious and does not have insomnia.         PAST MEDICAL HISTORY  Past Medical History:   Diagnosis Date    Dry eyes     Hypothyroid     Iron deficiency anemia     Lactose intolerance     Osteopenia     Polyarthralgia     Vitamin D deficiency        SOCIAL HISTORY  Social History     Socioeconomic History    Marital status:      Spouse name: None    Number of children: None    Years of education: None    Highest education level: None   Occupational History    None   Social Needs    Financial resource strain: None    Food insecurity     Worry: None     Inability: None    Transportation needs     Medical: None     Non-medical: None   Tobacco Use    Smoking status: Never Smoker    Smokeless tobacco: Never Used   Substance and Sexual Activity    Alcohol use: No     Alcohol/week: 0.0 standard drinks    Drug use: No    Sexual activity: None   Lifestyle    Physical activity     Days per week: None     Minutes per session: None    Stress: None   Relationships    Social connections     Talks on phone: None     Gets together: None     Attends Mu-ism service: None     Active member of club or organization: None     Attends meetings of clubs or organizations: None     Relationship status: None    Intimate partner violence     Fear of current or ex partner: None     Emotionally abused: None     Physically abused: None     Forced sexual activity: None   Other Topics Concern    None   Social History Narrative    None       FAMILY HISTORY  Family History   Problem Relation Age of Onset    Heart Disease Mother     Diabetes Mother     Heart Disease Father     Diabetes Son        SURGICAL HISTORY  Past Surgical History:   Procedure Laterality Date    APPENDECTOMY  1973    COLONOSCOPY  12/02/2019    Dr Jules Zarate N/A 2/11/2020    EXAMINATION UNDER ANESTHESIA, FISTULOTOMY performed by Priscilla Pat Sterling Reveles MD at [address] [place] [address] Dr. Sue Neither    UPPER GASTROINTESTINAL ENDOSCOPY  12/02/2019    Dr Esdras Martell   Allergen Reactions    Lactose Other (See Comments)     Bloating and Constipation.  Pcn [Penicillins] Hives    Ciprofloxacin Hcl Rash       CURRENT MEDICATIONS  Current Outpatient Medications   Medication Sig Dispense Refill    levothyroxine (EUTHYROX) 75 MCG tablet Take 1 tablet by mouth once daily 90 tablet 3    Cholecalciferol (VITAMIN D) 50 MCG (2000 UT) CAPS capsule Take 6,000 Units by mouth daily       TURMERIC PO Take 1 tablet by mouth daily      estradiol (ESTRACE) 0.1 MG/GM vaginal cream INSERT 1 GRAM BY VAGINAL ROUTE DAILY FOR 14 DAYS THEN 3 TIMES A WEEK THEREAFTER      CALCIUM-VITAMIN D PO Take by mouth daily      esomeprazole Magnesium (NEXIUM) 40 MG PACK Take 40 mg by mouth as needed       Multiple Vitamin (MULTIVITAMIN PO) Take  by mouth daily. No current facility-administered medications for this visit. Objective:  /68 (Site: Left Upper Arm, Position: Sitting, Cuff Size: Medium Adult)   Pulse 67   Temp 96.8 °F (36 °C)   Ht 5' 5\" (1.651 m)   Wt 185 lb 6.4 oz (84.1 kg)   SpO2 99%   BMI 30.85 kg/m²     General: No distress. Alert. Eyes: P ERRL. No sclera icterus. No conjunctival injection. ENT: No discharge. Pharynx clear. Resp: No accessory muscle use. CTA bilat. CV: Regular rate. Regular rhythm. No mumur or rub. No edema. M/S:   Upper extremities:  Muscle strength 5/5, FROM, No Synovitis   Shoulders: tender bilat. Elbows: bilateral elbow tenderness  Wrist: NT, NS, intact ROM   Hands: finkelstein bilat. DIP nodules bilat. With mild radial deviaiton of right 3rd distal phalenx     Lower Extremities:   Muscle strength 5/5, FROM, No Synovitis   Hips: left > right lateral hip pain, left hip worse with internal rotation of the hip.    Knee: mild crepitus, mild pain , Ankles/feet: bunion bilat. , Tender - right MTPs,  Callus right plantar MTPs. Spine:   - C-spine: tenderness along the posterior c-spine.   - Lumbar spine tenderness,   - Sacral spine tenderness     There are 14/18 tender fibromyalgia points. Neuro: DTR's 2/4 bilat and equal  Psych: Oriented to person, place, time. No anxiety or agitation. Skin: Warm and dry. No nodule on exposed extremities. No rash on exposed extremities. Lymph: No cervical LAD. No supraclavicular LAD.       RAPID 3    LABS:  CBC  Lab Results   Component Value Date    WBC 6.5 10/29/2020    RBC 4.02 10/29/2020    HGB 11.4 10/29/2020    HCT 37.3 10/29/2020    MCV 92.8 10/29/2020    MCH 28.4 10/29/2020    MCHC 30.6 10/29/2020    RDW 13.3 04/18/2017     10/29/2020       CMP  Lab Results   Component Value Date    CALCIUM 8.8 04/26/2018    LABALBU 4.3 04/26/2018    PROT 6.6 04/26/2018     04/26/2018    K 4.7 04/26/2018    CO2 26 04/26/2018     04/26/2018    BUN 16 10/29/2020    CREATININE 0.7 10/29/2020    ALT 20 04/26/2018    AST 17 04/26/2018       HgBA1c: No components found for: HGBA1C    Lab Results   Component Value Date    TSH 2.690 10/29/2020     Lab Results   Component Value Date    VITD25 14 10/29/2020         No results found for: AKANKSHA  No components found for: SSAABIGGREF  No components found for: SSBABIGGREF  No components found for: SMITHABIGGAR  No results found for: DSDNAAB   No results found for: C3, C4  No components found for: CYCCITPEPIGG  No results found for: RF    No components found for: CANCASCRN, APANCASCRN  Lab Results   Component Value Date    SEDRATE 12 10/29/2020     Lab Results   Component Value Date    CRP < 0.03 10/29/2020       AKANKSHA: negative (IFA)  DsDNA 189 (<302)  [place] <1  U1RNP 1 (<7)  RNP70 (<1) (<7)  SSA < 1 (<7)  SSB < 1 (<7)  CENP < 1 (<7)  Penny-1 < 1 (<7)  RF IgM < 1 (<3.5)  RF IgA 3 (<14)  CCP IgG 1 (< 7)  MCV 9  (< 20)  Cardiolipin IgM 2 (<20)  Cardiolipin IgG < 3 (< 20)  B2GP IgM <

## 2020-12-22 ENCOUNTER — HOSPITAL ENCOUNTER (OUTPATIENT)
Dept: GENERAL RADIOLOGY | Age: 60
Discharge: HOME | End: 2020-12-22
Payer: COMMERCIAL

## 2020-12-22 ENCOUNTER — HOSPITAL ENCOUNTER (OUTPATIENT)
Age: 60
Discharge: HOME | End: 2020-12-22
Payer: COMMERCIAL

## 2020-12-22 PROCEDURE — 72100 X-RAY EXAM L-S SPINE 2/3 VWS: CPT

## 2020-12-23 ENCOUNTER — TELEPHONE (OUTPATIENT)
Dept: RHEUMATOLOGY | Age: 60
End: 2020-12-23

## 2020-12-23 LAB — SARS-COV-2: NOT DETECTED

## 2020-12-23 NOTE — TELEPHONE ENCOUNTER
Diagnosis Orders   1.  Osteoarthritis of multiple joints, unspecified osteoarthritis type  MRI PELVIS W WO CONTRAST   2. SI joint arthritis  MRI PELVIS W WO CONTRAST

## 2020-12-23 NOTE — TELEPHONE ENCOUNTER
----- Message from Cathie Funk DO sent at 12/22/2020  6:46 PM EST -----  The x-rays of the lower back revealed degenerative disc disease (disc space narrowing) throughout the lumbar spine along with degenerative changes affecting the bilateral sacroiliac joints. Given your symptoms along with the x-ray changes I would like to obtain an MRI to evaluate for inflammation within the sacroiliac joints. Additionally upon review with the radiologist he noted that there are areas of possible foraminal stenosis (narrowing in in the openings by which the nerves exit the spine).

## 2021-01-07 ENCOUNTER — TELEPHONE (OUTPATIENT)
Dept: RHEUMATOLOGY | Age: 61
End: 2021-01-07

## 2021-01-07 NOTE — TELEPHONE ENCOUNTER
Patient called office requesting MRI order be emailed to her as [place]. Returned patients call and left detailed VM. Notified patient that we are unable to email orders per protocol. Also notified the order has to be authorized with insurance prior to scheduling. Patient to call with any questions or concerns.

## 2021-03-04 ENCOUNTER — NURSE ONLY (OUTPATIENT)
Dept: LAB | Age: 61
End: 2021-03-04

## 2021-03-04 ENCOUNTER — IMMUNIZATION (OUTPATIENT)
Dept: PRIMARY CARE CLINIC | Age: 61
End: 2021-03-04
Payer: COMMERCIAL

## 2021-03-04 ENCOUNTER — TELEPHONE (OUTPATIENT)
Dept: FAMILY MEDICINE CLINIC | Age: 61
End: 2021-03-04

## 2021-03-04 DIAGNOSIS — Z20.822 ENCOUNTER FOR LABORATORY TESTING FOR COVID-19 VIRUS: Primary | ICD-10-CM

## 2021-03-04 DIAGNOSIS — Z20.822 ENCOUNTER FOR LABORATORY TESTING FOR COVID-19 VIRUS: ICD-10-CM

## 2021-03-04 LAB — SARS-COV-2 ANTIBODY, TOTAL: NEGATIVE

## 2021-03-04 PROCEDURE — 91300 COVID-19, PFIZER VACCINE 30MCG/0.3ML DOSE: CPT | Performed by: FAMILY MEDICINE

## 2021-03-04 PROCEDURE — 0001A COVID-19, PFIZER VACCINE 30MCG/0.3ML DOSE: CPT | Performed by: FAMILY MEDICINE

## 2021-03-09 ENCOUNTER — HOSPITAL ENCOUNTER (OUTPATIENT)
Dept: MRI IMAGING | Age: 61
Discharge: HOME | End: 2021-03-09
Payer: COMMERCIAL

## 2021-03-09 DIAGNOSIS — R22.2 MASS ON BACK: ICD-10-CM

## 2021-03-09 PROCEDURE — 72148 MRI LUMBAR SPINE W/O DYE: CPT

## 2021-03-16 ENCOUNTER — TELEPHONE (OUTPATIENT)
Dept: FAMILY MEDICINE CLINIC | Age: 61
End: 2021-03-16

## 2021-03-16 DIAGNOSIS — E03.9 HYPOTHYROIDISM, UNSPECIFIED TYPE: Primary | ICD-10-CM

## 2021-03-16 DIAGNOSIS — E55.9 VITAMIN D DEFICIENCY: ICD-10-CM

## 2021-03-16 NOTE — TELEPHONE ENCOUNTER
Spoke to pt about results. Pt states you are managing the Vitamin D. Pt is taking 6000iu Daily. New lab order was mailed to pt for free T4.  BAILEE

## 2021-03-16 NOTE — TELEPHONE ENCOUNTER
Discussion noted. Increase vitamin D3 to 5000 units - 2 pills daily.   Recheck vitamin D 25 OH in 6 weeks

## 2021-03-25 ENCOUNTER — IMMUNIZATION (OUTPATIENT)
Dept: PRIMARY CARE CLINIC | Age: 61
End: 2021-03-25
Payer: COMMERCIAL

## 2021-03-25 PROCEDURE — 91300 COVID-19, PFIZER VACCINE 30MCG/0.3ML DOSE: CPT | Performed by: FAMILY MEDICINE

## 2021-03-25 PROCEDURE — 0002A COVID-19, PFIZER VACCINE 30MCG/0.3ML DOSE: CPT | Performed by: FAMILY MEDICINE

## 2021-04-04 NOTE — PROGRESS NOTES
[facility]  [facility]  Dept: [phone]  Dept Fax: [phone]    [facility] Mariann Ashraf is a 64 y. o.female    Pt presents for follow up of Hypothyroidism, Vitamin D Deficiency, Anemia, Osteopenia, and GERD. Pt feeling ok since last visit- interval history and any new issues noted below:     Patient previously referred to Dr. Belinda Ibarra in [facility] after TPO antibodies elevated (indicating possible Hashimoto's thyroiditis)appointment previously made for 2/16/2021- appt moved to  5/4/2021. Patient continues seeing Dr. Niels Zaman on a regular basismost recently, patient thought to have component of fibromyalgiato follow-up 6/21/2021. Pt continues seeing Dr. Jil Dimas for Iron Deficiency Anemia.        Pt had recent tilt table test, stress test, 48 hour holter monitor per Cardiology for near syncope. Work-up unrevealing. To have Cardiac event monitor soon. Wt Readings from Last 3 Encounters:   04/08/21 187 lb 12.8 oz (85.2 kg)   12/21/20 185 lb 6.4 oz (84.1 kg)   10/08/20 183 lb 9.6 oz (83.3 kg)   Weight increased 4# since last visit 6 months ago.  \"It will go down\"     Patient Active Problem List   Diagnosis    Lactose intolerance    Iron deficiency anemia    Diplopia    Osteoporosis    Hypothyroidism    Gastroesophageal reflux disease    Anal fistula       Current Outpatient Medications   Medication Sig Dispense Refill    esomeprazole (NEXIUM) 40 MG delayed release capsule Take 40 mg by mouth every morning (before breakfast)      Cholecalciferol (VITAMIN D3) 125 MCG (5000 UT) TABS Take 10,000 Units by mouth daily Indications: DOSE INCREASED PER ES ON 3/16/21      levothyroxine (EUTHYROX) 75 MCG tablet Take 1 tablet by mouth once daily 90 tablet 3    TURMERIC PO Take 1 tablet by mouth daily      estradiol (ESTRACE) 0.1 MG/GM vaginal cream INSERT 1 GRAM BY VAGINAL ROUTE DAILY FOR 14 DAYS THEN 3 TIMES A WEEK THEREAFTER      CALCIUM-VITAMIN D PO Take by mouth daily      Multiple Vitamin (MULTIVITAMIN PO) Take  by mouth daily. No current facility-administered medications for this visit. Review of Systems   Constitutional: Positive for chills and fatigue (occasional ). Negative for diaphoresis, fever and unexpected weight change. Eyes: Negative for visual disturbance. Respiratory: Positive for shortness of breath (occasional- \"feels muscular\" when tired. ). Negative for chest tightness. Cardiovascular: Positive for palpitations (occasional- Cardiologisrt aware. ). Negative for chest pain and leg swelling. Gastrointestinal: Positive for constipation (due to known IBS) and diarrhea ( due chayo known IBS). Negative for abdominal pain, anal bleeding, blood in stool, nausea and vomiting. Genitourinary: Negative for dysuria and hematuria. Musculoskeletal: Negative for neck pain. Neurological: Positive for light-headedness (occasional). Negative for dizziness and headaches. OBJECTIVE     /68 (Site: Right Upper Arm, Position: Sitting)   Pulse 108   Temp 97.3 °F (36.3 °C) (Skin)   Resp 16   Wt 187 lb 12.8 oz (85.2 kg)   BMI 31.25 kg/m²   Body mass index is 31.25 kg/m². BP Readings from Last 3 Encounters:   04/08/21 104/68   12/21/20 106/68   10/08/20 112/74     Physical Exam  Vitals signs and nursing note reviewed. Constitutional:       General: She is not in acute distress. Appearance: Normal appearance. She is normal weight. She is not ill-appearing, toxic-appearing or diaphoretic. HENT:      Head: Normocephalic and atraumatic. Right Ear: External ear normal.      Left Ear: External ear normal.      Nose: No rhinorrhea. Mouth/Throat:      Mouth: Mucous membranes are moist.      Pharynx: Oropharynx is clear. Eyes:      General: No scleral icterus. Right eye: No discharge. Left eye: No discharge. Extraocular Movements: Extraocular movements intact.       Conjunctiva/sclera: Conjunctivae 10/29/2020    HCT 37.3 10/29/2020    MCV 92.8 10/29/2020     10/29/2020     MRI LUMBAR SPINE WO CONTRAST  Impression       No mass is seen within the posterior soft tissues of the lumbar spine.       Transitional lumbosacral anatomy with lumbarization of S1.       Grade 1 anterolisthesis at L4-5. Uncovering of the disc results in moderate central canal narrowing and mild bilateral foraminal narrowing.       Disc bulge with superimposed extrusion at L5-S1 results in moderate central canal narrowing and moderate bilateral foraminal narrowing.                           **This report has been created using voice recognition software. It may contain minor errors which are inherent in voice recognition technology. **       Final report electronically signed by Dr. Gladys Prakash on 3/9/2021 1:13 PM         Immunization History   Administered Date(s) Administered    COVID-19, Wyman Peter, PF, 30mcg/0.3mL 03/04/2021, 03/25/2021    Meningococcal MCV4P (Menactra) 09/09/2013    Tdap (Boostrix, Adacel) 05/08/2020       Health Maintenance   Topic Date Due    Shingles Vaccine (1 of 2) Never done    Cervical cancer screen  05/01/2016    Colon cancer screen colonoscopy  06/13/2017    Flu vaccine (Season Ended) 10/08/2021 (Originally 9/1/2021)    TSH testing  10/29/2021    Breast cancer screen  12/21/2022    Lipid screen  07/23/2025    DTaP/Tdap/Td vaccine (2 - Td) 05/08/2030    COVID-19 Vaccine  Completed    Hepatitis A vaccine  Aged Out    Hepatitis B vaccine  Aged Out    Hib vaccine  Aged Out    Meningococcal (ACWY) vaccine  Aged Out    Pneumococcal 0-64 years Vaccine  Aged Out    Hepatitis C screen  Discontinued    HIV screen  Discontinued       AAA ultrasound (Male, 65-75, smoked ever) indicated at this time? No tobacco history  CT Lung Screen (50-80, 20 pk-yrs, smoking or quit <15 years) indicated at this time?   No tobacco history  Sleep Medicine referral indicated at this time (Obesity, Snoring, Daytime Somnolence, Apneic Episodes)? Pt denies any c urrent symptoms. Future Appointments   Date Time Provider Melecio Morrison   6/21/2021  3:00 PM Vonnie Ramos DO [facility]         ASSESSMENT       Diagnosis Orders   1. Iron deficiency anemia, unspecified iron deficiency anemia type     2. Hypothyroidism, unspecified type  Comprehensive Metabolic Panel    T4, Free    TSH without Reflex   3. Vitamin D deficiency  Comprehensive Metabolic Panel    Vitamin D 25 Hydroxy   4. Osteoporosis without current pathological fracture, unspecified osteoporosis type  Comprehensive Metabolic Panel   5. Gastroesophageal reflux disease, unspecified whether esophagitis present     6. Stage 3a chronic kidney disease  Comprehensive Metabolic Panel   7. B12 deficiency     8. Laboratory exam ordered as part of routine general medical examination  Comprehensive Metabolic Panel   9. Lipid screening     10. Screening for diabetes mellitus  Comprehensive Metabolic Panel   11. Dysuria  Urinalysis Reflex to Culture       PLAN      Recheck CMP, TSH/ FREE T4 in 6 weeks with previously ordered VIT D 25 OH   Check UA at this time per pt preference. Encouraged pt to look into other forms of calcium supplementation- Viactive Chews. Follow-up with Dr. Marie Martinez as planned. Follow-up with Dr. Snehal Prather as planned  Follow-up with Endocrinology (Dr. Bubba Currie) as planned  Follow up in 6-12 months.             Preventive Health Topics:  Encouraged annual FLU VACCINE- pt declines. (updated 4/8/2021)  Encouraged SHINGLES SHOT [facility]) - check with insurance re coverage and location of administration. (Doctors office vs local pharmacy) (updated 4/8/2021)  PAP/ PELVIC management per Dr. Herminio Flores to follow up 10-11/2020- to follow up annually. MAMMO due after 12/21/2021  COLONOSCOPY done 12/2/2019 per Dr. Alessandra Sanchez- to do again in 2029.  (updated 4/8/2021)  EGD done 12/2/2019 per Dr. Alessandra Sanchez- GERD, Inactive Gastritis- to follow up PRN.   Anemia most

## 2021-04-05 ENCOUNTER — NURSE ONLY (OUTPATIENT)
Dept: LAB | Age: 61
End: 2021-04-05

## 2021-04-05 DIAGNOSIS — E03.9 HYPOTHYROIDISM, UNSPECIFIED TYPE: ICD-10-CM

## 2021-04-05 LAB — T4 FREE: 1.77 NG/DL (ref 0.93–1.76)

## 2021-04-08 ENCOUNTER — OFFICE VISIT (OUTPATIENT)
Dept: FAMILY MEDICINE CLINIC | Age: 61
End: 2021-04-08
Payer: COMMERCIAL

## 2021-04-08 VITALS
SYSTOLIC BLOOD PRESSURE: 104 MMHG | TEMPERATURE: 97.3 F | RESPIRATION RATE: 16 BRPM | WEIGHT: 187.8 LBS | DIASTOLIC BLOOD PRESSURE: 68 MMHG | HEART RATE: 108 BPM | BODY MASS INDEX: 31.25 KG/M2

## 2021-04-08 DIAGNOSIS — Z00.00 LABORATORY EXAM ORDERED AS PART OF ROUTINE GENERAL MEDICAL EXAMINATION: ICD-10-CM

## 2021-04-08 DIAGNOSIS — Z13.1 SCREENING FOR DIABETES MELLITUS: ICD-10-CM

## 2021-04-08 DIAGNOSIS — R30.0 DYSURIA: ICD-10-CM

## 2021-04-08 DIAGNOSIS — N18.31 STAGE 3A CHRONIC KIDNEY DISEASE (HCC): ICD-10-CM

## 2021-04-08 DIAGNOSIS — E03.9 HYPOTHYROIDISM, UNSPECIFIED TYPE: ICD-10-CM

## 2021-04-08 DIAGNOSIS — M81.0 OSTEOPOROSIS WITHOUT CURRENT PATHOLOGICAL FRACTURE, UNSPECIFIED OSTEOPOROSIS TYPE: ICD-10-CM

## 2021-04-08 DIAGNOSIS — Z13.220 LIPID SCREENING: ICD-10-CM

## 2021-04-08 DIAGNOSIS — E55.9 VITAMIN D DEFICIENCY: ICD-10-CM

## 2021-04-08 DIAGNOSIS — E53.8 B12 DEFICIENCY: ICD-10-CM

## 2021-04-08 DIAGNOSIS — D50.9 IRON DEFICIENCY ANEMIA, UNSPECIFIED IRON DEFICIENCY ANEMIA TYPE: Primary | ICD-10-CM

## 2021-04-08 DIAGNOSIS — K21.9 GASTROESOPHAGEAL REFLUX DISEASE, UNSPECIFIED WHETHER ESOPHAGITIS PRESENT: ICD-10-CM

## 2021-04-08 PROCEDURE — G8417 CALC BMI ABV UP PARAM F/U: HCPCS | Performed by: FAMILY MEDICINE

## 2021-04-08 PROCEDURE — 3017F COLORECTAL CA SCREEN DOC REV: CPT | Performed by: FAMILY MEDICINE

## 2021-04-08 PROCEDURE — 99214 OFFICE O/P EST MOD 30 MIN: CPT | Performed by: FAMILY MEDICINE

## 2021-04-08 PROCEDURE — 1036F TOBACCO NON-USER: CPT | Performed by: FAMILY MEDICINE

## 2021-04-08 PROCEDURE — G8427 DOCREV CUR MEDS BY ELIG CLIN: HCPCS | Performed by: FAMILY MEDICINE

## 2021-04-08 RX ORDER — ESOMEPRAZOLE MAGNESIUM 40 MG/1
40 CAPSULE, DELAYED RELEASE ORAL
COMMUNITY

## 2021-04-08 ASSESSMENT — PATIENT HEALTH QUESTIONNAIRE - PHQ9
SUM OF ALL RESPONSES TO PHQ QUESTIONS 1-9: 0
SUM OF ALL RESPONSES TO PHQ9 QUESTIONS 1 & 2: 0
SUM OF ALL RESPONSES TO PHQ QUESTIONS 1-9: 0

## 2021-04-08 ASSESSMENT — ENCOUNTER SYMPTOMS
BLOOD IN STOOL: 0
NAUSEA: 0
ABDOMINAL PAIN: 0
ANAL BLEEDING: 0
CONSTIPATION: 1
DIARRHEA: 1
SHORTNESS OF BREATH: 1
CHEST TIGHTNESS: 0
VOMITING: 0

## 2021-04-08 NOTE — PATIENT INSTRUCTIONS
Recheck CMP, TSH/ FREE T4 in 6 weeks with previously ordered VIT D 25 OH   Check UA at this time per pt preference. Encouraged pt to look into other forms of calcium supplementation- Viactive Chews. Follow-up with Dr. Shanell Leos as planned. Follow-up with Dr. Bhargavi Singleton as planned  Follow-up with Endocrinology (Dr. Ousmane Quesada) as planned  Follow up in 6-12 months.             Preventive Health Topics:  Encouraged annual FLU VACCINE- pt declines. (updated 4/8/2021)  Encouraged SHINGLES SHOT [facility]) - check with insurance re coverage and location of administration. (Doctors office vs local pharmacy) (updated 4/8/2021)  PAP/ PELVIC management per Dr. Rachele Ball to follow up 10-11/2020- to follow up annually. MAMMO due after 12/21/2021  COLONOSCOPY done 12/2/2019 per Dr. Jeffrey Luke- to do again in 2029.  (updated 4/8/2021)  EGD done 12/2/2019 per Dr. Jeffrey Luke- GERD, Inactive Gastritis- to follow up PRN. Anemia most likely due to previous Haris-En- Y Bypass.   (updated 4/8/2021)  DEXA done 11/19/2019 per Dr. Blossom Abebe- Osteopenia with negative FRAX- to continue Calcium with Vitamin D supplementation and weight bearing exercise-to do again after 11/19/2021. (updated 4/8/2021)

## 2021-05-07 ENCOUNTER — NURSE ONLY (OUTPATIENT)
Dept: LAB | Age: 61
End: 2021-05-07

## 2021-05-07 LAB
CHOLESTEROL, TOTAL: 128 MG/DL (ref 100–199)
HDLC SERPL-MCNC: 68 MG/DL
LDL CHOLESTEROL CALCULATED: 48 MG/DL
T4 FREE: 1.6 NG/DL (ref 0.93–1.76)
TRIGL SERPL-MCNC: 59 MG/DL (ref 0–199)
TSH SERPL DL<=0.05 MIU/L-ACNC: 3.45 UIU/ML (ref 0.4–4.2)
VITAMIN D 25-HYDROXY: 25 NG/ML (ref 30–100)

## 2021-05-11 ENCOUNTER — TELEPHONE (OUTPATIENT)
Dept: FAMILY MEDICINE CLINIC | Age: 61
End: 2021-05-11

## 2021-05-11 DIAGNOSIS — M81.0 OSTEOPOROSIS WITHOUT CURRENT PATHOLOGICAL FRACTURE, UNSPECIFIED OSTEOPOROSIS TYPE: ICD-10-CM

## 2021-05-11 DIAGNOSIS — N18.31 STAGE 3A CHRONIC KIDNEY DISEASE (HCC): ICD-10-CM

## 2021-05-11 DIAGNOSIS — E55.9 VITAMIN D DEFICIENCY: Primary | ICD-10-CM

## 2021-05-11 NOTE — LETTER
[address] [place] [address],[address] [facility] [address]  Phone: [phone]  Fax: [phone]    Saud Paulino MD        June 8, 2021       Gt [place], this letter is to notify you that the office has been trying to reach you by phone regarding your recent lab results. If you could please call the office to discuss your lab work that would be greatly appreciated.       Sincerely,        Saud Paulino MD

## 2021-05-11 NOTE — TELEPHONE ENCOUNTER
Component      Latest Ref Rng & Units 5/7/2021   Cholesterol, Total      100 - 199 mg/dL 128   Triglycerides      0 - 199 mg/dL 59   HDL Cholesterol      mg/dL 68   LDL Calculated      mg/dL 48   Vit D, 25-Hydroxy      30 - 100 ng/ml 25 (L)   T4 Free      0.93 - 1.76 ng/dL 1.60   TSH      0.400 - 4.200 uIU/mL 3.450       FLP great  Free T4/TSH okay-continue current dose of levothyroxine  Vitamin D level low at 25  Is patient taking vitamin D3 10,000 units daily faithfully on a regular basis? If so, will likely need to increase vitamin D. Let me know.   ES

## 2021-05-11 NOTE — TELEPHONE ENCOUNTER
Discussion noted. Okay to continue vitamin D3-15,000 units daily as most recent vitamin D level subtherapeutic. Recheck VIT D 25 OH in 2 to 3 months. She was given the diagnosis of stage IIIa chronic kidney disease due to her recent GFR in July 2020 being low at 56-her recheck level in October 2020 was significantly improved at 80. I am not concerned about this at this time, however, I would recommend the patient continue to remain well-hydrated. We will continue to follow in the future. Nothing further needed at this time.   ES

## 2021-05-11 NOTE — TELEPHONE ENCOUNTER
Patient notified of results. She states that she remained on the Vit D 10,000 units daily for about 3 -4 weeks after her most recent visit with you but decided on her own to increase to 15,000 units daily. She has only done this for about 1 week. Please advise    Patient was also questioning the diagnosis of stage 3a kidney disease. States this was listed on her recent lab orders from you along with her AVS. Would like to know why it is there as she has never been aware of the diagnosis.

## 2021-05-21 ENCOUNTER — HOSPITAL ENCOUNTER (OUTPATIENT)
Age: 61
Discharge: HOME | End: 2021-05-21
Payer: COMMERCIAL

## 2021-05-21 LAB
INFLUENZA A: NOT DETECTED
INFLUENZA B: NOT DETECTED
SARS-COV-2 RNA, RT PCR: NOT DETECTED

## 2021-05-21 PROCEDURE — 87636 SARSCOV2 & INF A&B AMP PRB: CPT

## 2021-07-02 NOTE — TELEPHONE ENCOUNTER
[place]. She was notified of ES response and recommendations and is agreeable. She wants to know if she can have a GFR and hepatic panel checked along with the other lab that is due in mid-July to mid-August. Please advise. No need to call pt back unless orders are not going to be written. Mail orders to pt.

## 2021-07-10 DIAGNOSIS — L23.7 ALLERGIC DERMATITIS DUE TO POISON SUMAC: Primary | ICD-10-CM

## 2021-07-10 DIAGNOSIS — L23.9 ALLERGIC DERMATITIS: ICD-10-CM

## 2021-07-10 RX ORDER — PREDNISONE 20 MG/1
TABLET ORAL
Qty: 42 TABLET | Refills: 0 | Status: SHIPPED | OUTPATIENT
Start: 2021-07-10 | End: 2022-07-08 | Stop reason: ALTCHOICE

## 2021-07-10 NOTE — PROGRESS NOTES
Exposed to poison sumac on Monday. Rash has continued to spread onto eye, face, neck. Pruritis. OTC steroid cream helping with itching but rash continues to spread. Denies systemic symptoms including SOB, n/v. Oral steroid ep'ed to requested pharmacy. All questions answered. ED for worsening symptoms.

## 2021-07-13 ENCOUNTER — TELEPHONE (OUTPATIENT)
Dept: FAMILY MEDICINE CLINIC | Age: 61
End: 2021-07-13

## 2021-07-13 RX ORDER — TRIAMCINOLONE ACETONIDE 1 MG/G
CREAM TOPICAL
Qty: 80 G | Refills: 1 | Status: SHIPPED | OUTPATIENT
Start: 2021-07-13 | End: 2022-07-08 | Stop reason: ALTCHOICE

## 2021-07-13 NOTE — TELEPHONE ENCOUNTER
Pt left a vm stating she has poison sumac and has tried everything otc to try and relieve the itching. States she has not gotten any relief and it keeps her up at night from how bad it itches and was requesting a prescription for ointment or advice on what she should do to treat the poison sumac.  Best number to call pt is [phone]

## 2021-07-13 NOTE — TELEPHONE ENCOUNTER
Notified pt if ES response. She stated that she doesn't thinks that she needs an injection, prefers to have something topical prescribed to just use on her hands. All of the other spots have gotten better with the oral steroids. Please advise.

## 2021-07-13 NOTE — TELEPHONE ENCOUNTER
Noted.  Joe Wilcox for triamcinolone cream 0.1%-apply twice daily to affected area. #1 tube/1 refill. Keep us updated.   ES

## 2021-07-13 NOTE — TELEPHONE ENCOUNTER
Spoke pt and she states that she spoke to the physician on call on 7/10/21 and was prescribed a Prednisone taper. She is taking this as prescribed but is still having terrible itching on both hands. She is using Cortisone 10, cold wraps and Calamine lotion and nothing is stopping the itch. Pt wants to know if anything else can be prescribed. Uses [facility]. Offered appt with WS today but pt declined. Please advise and call pt back. Of note, pt is going to hold on getting her labs done until she is finished with the Prednisone.

## 2021-07-21 ENCOUNTER — OFFICE VISIT (OUTPATIENT)
Dept: RHEUMATOLOGY | Age: 61
End: 2021-07-21
Payer: COMMERCIAL

## 2021-07-21 VITALS
DIASTOLIC BLOOD PRESSURE: 78 MMHG | HEIGHT: 65 IN | WEIGHT: 188.7 LBS | BODY MASS INDEX: 31.44 KG/M2 | SYSTOLIC BLOOD PRESSURE: 120 MMHG

## 2021-07-21 DIAGNOSIS — M65.4 DE QUERVAIN'S DISEASE (RADIAL STYLOID TENOSYNOVITIS): ICD-10-CM

## 2021-07-21 DIAGNOSIS — M15.9 OSTEOARTHRITIS OF MULTIPLE JOINTS, UNSPECIFIED OSTEOARTHRITIS TYPE: ICD-10-CM

## 2021-07-21 DIAGNOSIS — M47.818 SI JOINT ARTHRITIS: ICD-10-CM

## 2021-07-21 DIAGNOSIS — M46.90 AXIAL SPONDYLOARTHRITIS (HCC): Primary | ICD-10-CM

## 2021-07-21 PROCEDURE — 1036F TOBACCO NON-USER: CPT | Performed by: INTERNAL MEDICINE

## 2021-07-21 PROCEDURE — 3017F COLORECTAL CA SCREEN DOC REV: CPT | Performed by: INTERNAL MEDICINE

## 2021-07-21 PROCEDURE — G8427 DOCREV CUR MEDS BY ELIG CLIN: HCPCS | Performed by: INTERNAL MEDICINE

## 2021-07-21 PROCEDURE — 99214 OFFICE O/P EST MOD 30 MIN: CPT | Performed by: INTERNAL MEDICINE

## 2021-07-21 PROCEDURE — G8417 CALC BMI ABV UP PARAM F/U: HCPCS | Performed by: INTERNAL MEDICINE

## 2021-07-21 ASSESSMENT — ENCOUNTER SYMPTOMS
EYE PAIN: 0
EYES NEGATIVE: 1
DOUBLE VISION: 0
VOMITING: 0
BLURRED VISION: 0
NAUSEA: 0
EYE REDNESS: 0
DIARRHEA: 0
BLOOD IN STOOL: 0
CONSTIPATION: 1
HEARTBURN: 1
RESPIRATORY NEGATIVE: 1

## 2021-07-21 NOTE — PROGRESS NOTES
[address] [address]  Rheumatology Adult Consult/Referral Note       7/21/2021  MRN: [id number]        HPI:   Timm Olszewski  is a(n)60 y.o. female with a hx of Iron def anemia, hypothyroidism, Vitamin d def, osteoporosis (t-score -2.6 in 8/2014)  Here for the f/u evaluation of her arthralgias and myalgias. On prednisone for poison sumac. W/ decreased joint pains. MRI of the pelvis with troch bursitis, sacroliitis on the right per the outside read. Ongoing joint pain greatest in the lower back , knees. Some jotin pain in the hands, wrists, eblos, shoulder, feet. Pain 4/10 (on prednisone)  Timing: mornings. Aggravating: consumption of tomatos, chicken. Weather changes. Alleviating: ibuprofen, prednisone. , tumeric. AM stiffness 30-45 minutes. Dry mouth, improve with jacque candies, gum, fluid intake. Dry eyes improved with topical eye drops prn. Runny nose. ROS:  Review of Systems   Constitutional: Positive for malaise/fatigue. Negative for fever. HENT: Positive for congestion. Negative for hearing loss and nosebleeds. Eyes: Negative. Negative for blurred vision, double vision, pain and redness. Respiratory: Negative. Cardiovascular: Positive for palpitations (mainly while tired). Negative for chest pain. Gastrointestinal: Positive for constipation and heartburn. Negative for blood in stool, diarrhea, nausea and vomiting. Genitourinary: Negative. Negative for hematuria. Musculoskeletal: Positive for myalgias. Skin: Negative for rash. Neurological: Positive for tingling. Negative for dizziness, weakness and headaches. Endo/Heme/Allergies: Negative. Psychiatric/Behavioral: Negative for depression. The patient is not nervous/anxious and does not have insomnia.         PAST MEDICAL HISTORY  Past Medical History:   Diagnosis Date    Dry eyes     Hypothyroid     Iron deficiency anemia     Lactose intolerance     Osteopenia     Polyarthralgia     Vitamin D deficiency        SOCIAL HISTORY  Social History     Socioeconomic History    Marital status:      Spouse name: None    Number of children: None    Years of education: None    Highest education level: None   Occupational History    None   Tobacco Use    Smoking status: Never Smoker    Smokeless tobacco: Never Used   Vaping Use    Vaping Use: Never used   Substance and Sexual Activity    Alcohol use: No     Alcohol/week: 0.0 standard drinks    Drug use: No    Sexual activity: None   Other Topics Concern    None   Social History Narrative    None     Social Determinants of Health     Financial Resource Strain:     Difficulty of Paying Living Expenses:    Food Insecurity:     Worried About Running Out of Food in the Last Year:     Ran Out of Food in the Last Year:    Transportation Needs:     Lack of Transportation (Medical):      Lack of Transportation (Non-Medical):    Physical Activity:     Days of Exercise per Week:     Minutes of Exercise per Session:    Stress:     Feeling of Stress :    Social Connections:     Frequency of Communication with Friends and Family:     Frequency of Social Gatherings with Friends and Family:     Attends Rastafari Services:     Active Member of Clubs or Organizations:     Attends Club or Organization Meetings:     Marital Status:    Intimate Partner Violence:     Fear of Current or Ex-Partner:     Emotionally Abused:     Physically Abused:     Sexually Abused:        FAMILY HISTORY  Family History   Problem Relation Age of Onset    Heart Disease Mother     Diabetes Mother     Heart Disease Father     Diabetes Son        SURGICAL HISTORY  Past Surgical History:   Procedure Laterality Date   [address]    COLONOSCOPY  12/02/2019    Dr Xuan Lockhart N/A 2/11/2020    EXAMINATION UNDER ANESTHESIA, FISTULOTOMY performed by Dillon Frias MD at [address] Dr. Brice Luna Severino Mart UPPER GASTROINTESTINAL ENDOSCOPY  12/02/2019    Dr Lambert Folds  Allergies   Allergen Reactions    Lactose Other (See Comments)     Bloating and Constipation.  Pcn [Penicillins] Hives    Ciprofloxacin Hcl Rash       CURRENT MEDICATIONS  Current Outpatient Medications   Medication Sig Dispense Refill    triamcinolone (KENALOG) 0.1 % cream Apply topically to affected area twice daily 80 g 1    predniSONE (DELTASONE) 20 MG tablet 3 tabs x 7 days, 2 tabs x7 days, 1 tab x7 days 42 tablet 0    esomeprazole (NEXIUM) 40 MG delayed release capsule Take 40 mg by mouth every morning (before breakfast)      Cholecalciferol (VITAMIN D3) 125 MCG (5000 UT) TABS Take 10,000 Units by mouth daily Indications: DOSE INCREASED PER ES ON 3/16/21      levothyroxine (EUTHYROX) 75 MCG tablet Take 1 tablet by mouth once daily 90 tablet 3    TURMERIC PO Take 1 tablet by mouth daily      estradiol (ESTRACE) 0.1 MG/GM vaginal cream INSERT 1 GRAM BY VAGINAL ROUTE DAILY FOR 14 DAYS THEN 3 TIMES A WEEK THEREAFTER      CALCIUM-VITAMIN D PO Take by mouth daily      Multiple Vitamin (MULTIVITAMIN PO) Take  by mouth daily. No current facility-administered medications for this visit. Objective:  /78 (Site: Left Upper Arm, Position: Sitting, Cuff Size: Medium Adult)   Ht 5' 5\" (1.651 m)   Wt 188 lb 11.2 oz (85.6 kg)   BMI 31.40 kg/m²     General: No distress. Alert. Eyes: P ERRL. No sclera icterus. No conjunctival injection. ENT: No discharge. Pharynx clear. M/S:   Upper extremities:  Muscle strength 5/5, FROM, No Synovitis   Shoulders: tender bilat. Elbows: bilateral elbow tenderness  Wrist: NT, NS, intact ROM   Hands: finkelstein bilat. DIP nodules bilat. - tender 2,3 MCPs bilat. , PIP right # 4,    - mild swelling/fullness MCPS right 2,3 left 3rd. PIPs: righ 4th.      Lower Extremities:   Muscle strength 5/5, FROM, No Synovitis   Knee: tender, warmth, swelling       There are 14/18 tender fibromyalgia points. Psych: Oriented to person, place, time. No anxiety or agitation. Skin: scabbed lesions along the forearms. Lymph: No cervical LAD. No supraclavicular LAD. RAPID 3    LABS:  CBC  Lab Results   Component Value Date    WBC 6.5 10/29/2020    RBC 4.02 10/29/2020    HGB 11.4 10/29/2020    HCT 37.3 10/29/2020    MCV 92.8 10/29/2020    MCH 28.4 10/29/2020    MCHC 30.6 10/29/2020    RDW 13.3 04/18/2017     10/29/2020       CMP  Lab Results   Component Value Date    CALCIUM 8.8 04/26/2018    LABALBU 4.3 04/26/2018    PROT 6.6 04/26/2018     04/26/2018    K 4.7 04/26/2018    CO2 26 04/26/2018     04/26/2018    BUN 16 10/29/2020    CREATININE 0.7 10/29/2020    ALT 20 04/26/2018    AST 17 04/26/2018       HgBA1c: No components found for: HGBA1C    Lab Results   Component Value Date    TSH 3.450 05/07/2021     Lab Results   Component Value Date    VITD25 25 05/07/2021     Lab Results   Component Value Date    SEDRATE 12 10/29/2020     Lab Results   Component Value Date    CRP < 0.03 10/29/2020       AKANKSHA: negative (IFA)  DsDNA 189 (<302)  [place] <1  U1RNP 1 (<7)  RNP70 (<1) (<7)  SSA < 1 (<7)  SSB < 1 (<7)  CENP < 1 (<7)  Penny-1 < 1 (<7)  RF IgM < 1 (<3.5)  RF IgA 3 (<14)  CCP IgG 1 (< 7)  MCV 9  (< 20)  Cardiolipin IgM 2 (<20)  Cardiolipin IgG < 3 (< 20)  B2GP IgM < 1 (<21)  B2GP IgG < 6 (<21)  Thyroglobulin IgG 48 (40-60 equivocal)  TPO IgG 615 (> 35 positive)       CT A/P    2015 2017      MRI pelvis : jan 9, 2021. DEXA:  11-     BMD T-score  Z-sore   Right femoral neck 0.613 -2.1 -1   Left femoral neck 0.99 -1.4 -0.2   Lumbar spine  0.772 -2.20 -1.10           Assessment/ Plan:  Generalized joint pains in the fingers, wrist, elbows, shoulders, hips, knees, ankles, feet, neck and back. Most severe pain lower back.  Symptoms started: Around 2015 with the development of arthralgias affecting the shoulders, elbows, fingers, hips, knees along with generalized myofascial tenderness of the upper and lower extremities. Occasional shooting pain from lower back to the anterior thigh. Timing: mornings and evenings after sitting down. Aggravating factors: weather changes. Inactivity. ,   Pressures along the upper arms and lower legs. Alleviating factors: Ibuprofen 800mg qd (moderate to a lot relief). (+) dry mouth/Dry eyes. - exam with wide spread tenderness, 14/18 FMS tender point. (+) lower back tenderness  Previously treated by  Dr. Whit Kelley 2016 for possible  Lupus like syndrome vs Sjogren syndrome based upon her polyarthralgia's/myalgia's that improved with the joint pains. She was started on plaquenil 200mg qd and then transitioned to bid dosing w/ ?  Relief. undiff axial spondylaorthritis. -   prior CT A/P 2017 and 2015 with evidence of SI joint and pubic symphysis changes. + cystica and ? erosive changes. (have asked radiology to review 12/21/2020)  , sx's with some inflammatory back symptoms, + gelling, relief with movement & NSAIDs, mild AM stiffness (15 minutes) - prior therapy Mobic, plaquenil,  Tylenol (no relief), Naproxen (no relief), prednisone taper (significant relief) . MRI pelvis with sacroilitis. -  tb gold in prepration to start anti-TNF therapy [employer]. -  TNF INHIBITORS can cause increased risk of TB reactivation, oppurtunistic infections, lupus like illness, rash, hypersensitivity reaction, infusion reactions, demyelinating disease and possible risk of malignancies and lung diseases and were explained to the patient   - - cont. Tumeric daily        2. Vitamin D def: - 1-25-oh vitamin D of 99.6 (20-82) 10/26/17    3. Osteoporosis: - treated by Gynecologist  - previously treatment --- Alendronate 70mg q wk x 3 years    4. Troch bursitis - can be related ot #1. 5. Medication monitoring - need tb gold and hepatitis panel prior to starting ant-TNF therapy     Return in about 3 months (around 10/21/2021).     Electronically signed by Karri Alva DO on 7/21/2021 at 8:31 AM      Thank you for allowing me to participate in the care of this patient. Please call if there are any questions.

## 2021-07-21 NOTE — PATIENT INSTRUCTIONS
[email or web address]    Other good resources - [facility]. [employer]      Spondyloarthritis is a type of arthritis that attacks the spine and, in some people, the joints of the arms and legs. It can also involve the skin, intestines and eyes. The main symptom (what you feel) in most patients is low back pain. This occurs most often in axial spondyloarthritis. In a minority of patients, the major symptom is pain and swelling in the arms and legs. This type is known as peripheral spondyloarthritis. People in their teens and 25s, particularly males, are affected most often. Family members of those with spondyloarthritis are at higher risk. Many people with axial spondyloarthritis progress to having some degree of spinal fusion, known as ankylosing spondylitis. This more often strikes young males. Non-steroidal anti-inflammatory drugs (commonly called NSAIDs) offer symptom relief for most patients by reducing pain and swelling. Other medicines called biologics, including anti-TNF drugs (TNF blockers) and anti-IL-17 drugs (IL-17 blockers) are effective in patients who do not respond well enough to NSAIDs. Newer treatments have helped a great deal in controlling symptoms. Frequent fitness activities and back exercises are important in managing the symptons of spondyloarthritis. Spondyloarthritis (or spondyloarthropathy) is the name for a family of inflammatory rheumatic diseases that cause arthritis. It differs from other types of arthritis, because it involves the sites are where ligaments and tendons attach to bones called entheses.  Symptoms present in two main ways. The first is inflammation causing pain and stiffness, most often of the spine. Some forms can affect the hands and feet or arms and legs. The second type is bone destruction causing deformities of the spine and poor function of the shoulders and hips.     The most common is ankylosing spondylitis, which affects mainly the spine. Others include:    axial spondyloarthritis, which affects mainly the spine and pelvic joints  peripheral spondyloarthritis, affecting mostly the arms and legs  reactive arthritis (formerly known as Jess's syndrome)  psoriatic arthritis  enteropathic arthritis/spondylitis associated with inflammatory bowel diseases (ulcerative colitis and Crohn's disease).

## 2021-09-27 DIAGNOSIS — E03.9 HYPOTHYROIDISM, UNSPECIFIED TYPE: ICD-10-CM

## 2021-10-07 RX ORDER — LEVOTHYROXINE SODIUM 0.07 MG/1
TABLET ORAL
Qty: 90 TABLET | Refills: 3 | Status: SHIPPED | OUTPATIENT
Start: 2021-10-07 | End: 2022-08-12 | Stop reason: SDUPTHER

## 2021-10-07 NOTE — TELEPHONE ENCOUNTER
Request sent from [address] for refill of levothyroxine 75 mcg qd. Last seen 4/8/21, next appt 4/11/22. Last TFTs done on 5/7/21. Medication verified. Order pended and set to escribe.

## 2021-10-09 LAB — SARS-COV-2, POC: NEGATIVE

## 2021-10-11 LAB — SARS-COV-2: NOT DETECTED

## 2021-12-22 LAB — SARS-COV-2: NEGATIVE

## 2022-01-27 ENCOUNTER — TELEPHONE (OUTPATIENT)
Dept: FAMILY MEDICINE CLINIC | Age: 62
End: 2022-01-27

## 2022-01-27 NOTE — TELEPHONE ENCOUNTER
Spoke with pt regarding rescheduling apt on Monday 4/11/2022 with Leighann Ridley or Emanuel Sutherland as Dr. Serene Rodriguez will not be in the office. Pt is going to follow Dr. Serene Rodriguez and will wait for letter to make future apt.

## 2022-03-24 ENCOUNTER — HOSPITAL ENCOUNTER (OUTPATIENT)
Age: 62
Discharge: HOME | End: 2022-03-24

## 2022-03-24 LAB — RUBELLA: 22.9 IU/ML

## 2022-03-25 ENCOUNTER — HOSPITAL ENCOUNTER (OUTPATIENT)
Dept: WOMENS IMAGING | Age: 62
Discharge: HOME | End: 2022-03-25
Payer: COMMERCIAL

## 2022-03-25 DIAGNOSIS — Z12.31 VISIT FOR SCREENING MAMMOGRAM: ICD-10-CM

## 2022-03-25 PROCEDURE — 77063 BREAST TOMOSYNTHESIS BI: CPT

## 2022-03-27 LAB — RUBEOLA IGG: > 300 AU/ML

## 2022-03-28 LAB
MUMPS IGG TITER: 6.78
VZV IGG SER QL IA: 2.99

## 2022-04-06 ENCOUNTER — OFFICE VISIT (OUTPATIENT)
Dept: RHEUMATOLOGY | Age: 62
End: 2022-04-06
Payer: COMMERCIAL

## 2022-04-06 VITALS
BODY MASS INDEX: 31.49 KG/M2 | HEIGHT: 65 IN | SYSTOLIC BLOOD PRESSURE: 118 MMHG | WEIGHT: 189 LBS | DIASTOLIC BLOOD PRESSURE: 76 MMHG | OXYGEN SATURATION: 96 % | HEART RATE: 80 BPM

## 2022-04-06 DIAGNOSIS — M46.90 AXIAL SPONDYLOARTHRITIS (HCC): Primary | ICD-10-CM

## 2022-04-06 DIAGNOSIS — M65.4 DE QUERVAIN'S DISEASE (RADIAL STYLOID TENOSYNOVITIS): ICD-10-CM

## 2022-04-06 DIAGNOSIS — M15.9 OSTEOARTHRITIS OF MULTIPLE JOINTS, UNSPECIFIED OSTEOARTHRITIS TYPE: ICD-10-CM

## 2022-04-06 PROCEDURE — G8417 CALC BMI ABV UP PARAM F/U: HCPCS | Performed by: INTERNAL MEDICINE

## 2022-04-06 PROCEDURE — G8427 DOCREV CUR MEDS BY ELIG CLIN: HCPCS | Performed by: INTERNAL MEDICINE

## 2022-04-06 PROCEDURE — 1036F TOBACCO NON-USER: CPT | Performed by: INTERNAL MEDICINE

## 2022-04-06 PROCEDURE — 3017F COLORECTAL CA SCREEN DOC REV: CPT | Performed by: INTERNAL MEDICINE

## 2022-04-06 PROCEDURE — 99215 OFFICE O/P EST HI 40 MIN: CPT | Performed by: INTERNAL MEDICINE

## 2022-04-06 ASSESSMENT — ENCOUNTER SYMPTOMS
DIARRHEA: 0
BLOOD IN STOOL: 0
BLURRED VISION: 0
CONSTIPATION: 1
RESPIRATORY NEGATIVE: 1
EYE REDNESS: 0
EYES NEGATIVE: 1
HEARTBURN: 1
NAUSEA: 0
DOUBLE VISION: 0
VOMITING: 0
EYE PAIN: 0

## 2022-04-06 NOTE — PROGRESS NOTES
[facility]  Rheumatology Adult Consult/Referral Note       4/6/2022  MRN: [id number]        HPI:   Pallavi Hernandez  is a(n)61 y.o. female with a hx of Iron def anemia, hypothyroidism, Vitamin d def, osteoporosis (t-score -2.6 in 8/2014)  Here for the f/u evaluation of her arthralgias and myalgias. Stopped the prednisone and ibuprofen since the last evaluation. Ongoing generalized pain hands, wrists, shoulder, feet. Hips and back. Timing: mornings. Aggravating: certain foods, consumption of tomatos, chicken. Weather changes. Alleviating:  tumeric. Pain 4/10 (on prednisone)    AM stiffness 5-10 minutes. Sicca sx's continued - eyes and mouth. ROS:  Review of Systems   Constitutional: Positive for malaise/fatigue. Negative for fever. HENT: Positive for congestion. Negative for hearing loss and nosebleeds. Eyes: Negative. Negative for blurred vision, double vision, pain and redness. Respiratory: Negative. Cardiovascular: Positive for palpitations (mainly while tired). Negative for chest pain. Gastrointestinal: Positive for constipation and heartburn. Negative for blood in stool, diarrhea, nausea and vomiting. Genitourinary: Negative. Negative for hematuria. Musculoskeletal: Positive for myalgias. Skin: Negative for rash. Neurological: Positive for tingling. Negative for dizziness, weakness and headaches. Endo/Heme/Allergies: Negative. Psychiatric/Behavioral: Negative for depression. The patient is not nervous/anxious and does not have insomnia.         PAST MEDICAL HISTORY  Past Medical History:   Diagnosis Date    Dry eyes     Hypothyroid     Iron deficiency anemia     Lactose intolerance     Osteopenia     Polyarthralgia     Vitamin D deficiency        SOCIAL HISTORY  Social History     Socioeconomic History    Marital status:      Spouse name: None    Number of children: None    Years of education: None    Highest education level: None   Occupational History    None   Tobacco Use    Smoking status: Never Smoker    Smokeless tobacco: Never Used   Vaping Use    Vaping Use: Never used   Substance and Sexual Activity    Alcohol use: No     Alcohol/week: 0.0 standard drinks    Drug use: No    Sexual activity: None   Other Topics Concern    None   Social History Narrative    None     Social Determinants of Health     Financial Resource Strain:     Difficulty of Paying Living Expenses: Not on file   Food Insecurity:     Worried About Running Out of Food in the Last Year: Not on file    Jania of Food in the Last Year: Not on file   Transportation Needs:     Lack of Transportation (Medical): Not on file    Lack of Transportation (Non-Medical):  Not on file   Physical Activity:     Days of Exercise per Week: Not on file    Minutes of Exercise per Session: Not on file   Stress:     Feeling of Stress : Not on file   Social Connections:     Frequency of Communication with Friends and Family: Not on file    Frequency of Social Gatherings with Friends and Family: Not on file    Attends [employer] Services: Not on file    Active Member of [address] [place] [address] or Organizations: Not on file    Attends Club or Organization Meetings: Not on file    Marital Status: Not on file   Intimate Partner Violence:     Fear of Current or Ex-Partner: Not on file    Emotionally Abused: Not on file    Physically Abused: Not on file    Sexually Abused: Not on file   Housing Stability:     Unable to Pay for Housing in the Last Year: Not on file    Number of [place] in the Last Year: Not on file    Unstable Housing in the Last Year: Not on file       FAMILY HISTORY  Family History   Problem Relation Age of Onset    Heart Disease Mother     Diabetes Mother     Heart Disease Father     Diabetes Son        SURGICAL HISTORY  Past Surgical History:   Procedure Laterality Date   [address]    COLONOSCOPY  12/02/2019    Dr Neyda Wilson FISSURECTOMY ANAL N/A 2/11/2020    EXAMINATION UNDER ANESTHESIA, FISTULOTOMY performed by Loren Caceres MD at [address] [place] [address] Dr. Sudarshan Rodriguez    UPPER GASTROINTESTINAL ENDOSCOPY  12/02/2019    Dr Alisha Chaidez   Allergen Reactions    Lactose Other (See Comments)     Bloating and Constipation.  Pcn [Penicillins] Hives    Ciprofloxacin Hcl Rash       CURRENT MEDICATIONS  Current Outpatient Medications   Medication Sig Dispense Refill    levothyroxine (SYNTHROID) 75 MCG tablet Take 1 tablet by mouth once daily 90 tablet 3    triamcinolone (KENALOG) 0.1 % cream Apply topically to affected area twice daily 80 g 1    predniSONE (DELTASONE) 20 MG tablet 3 tabs x 7 days, 2 tabs x7 days, 1 tab x7 days 42 tablet 0    esomeprazole (NEXIUM) 40 MG delayed release capsule Take 40 mg by mouth every morning (before breakfast)      Cholecalciferol (VITAMIN D3) 125 MCG (5000 UT) TABS Take 10,000 Units by mouth daily Indications: DOSE INCREASED PER ES ON 3/16/21      TURMERIC PO Take 1 tablet by mouth daily      estradiol (ESTRACE) 0.1 MG/GM vaginal cream INSERT 1 GRAM BY VAGINAL ROUTE DAILY FOR 14 DAYS THEN 3 TIMES A WEEK THEREAFTER      CALCIUM-VITAMIN D PO Take by mouth daily      Multiple Vitamin (MULTIVITAMIN PO) Take  by mouth daily. No current facility-administered medications for this visit. Objective:  /76 (Site: Left Upper Arm, Position: Sitting, Cuff Size: Large Adult)   Pulse 80   Ht 5' 5\" (1.651 m)   Wt 189 lb (85.7 kg)   SpO2 96%   BMI 31.45 kg/m²     General: No distress. Alert. Eyes: P ERRL. No sclera icterus. No conjunctival injection. ENT: No discharge. Pharynx clear. M/S:   Upper extremities:  Muscle strength 5/5, FROM, No Synovitis   Hands: finkelstein bilat. DIP nodules bilat. Lower Extremities:   Muscle strength 5/5, FROM, No Synovitis   Knee: tender  Feet: tender mtps and mid foot bilat. Spine: tender lumbosacral spine. There are 14/18 tender fibromyalgia points. Psych: Oriented to person, place, time. No anxiety or agitation. Skin: scabbed lesions along the forearms. Lymph: No cervical LAD. No supraclavicular LAD. RAPID 3    LABS:  CBC  Lab Results   Component Value Date    WBC 6.5 10/29/2020    RBC 4.02 10/29/2020    HGB 11.4 10/29/2020    HCT 37.3 10/29/2020    MCV 92.8 10/29/2020    MCH 28.4 10/29/2020    MCHC 30.6 10/29/2020    RDW 13.3 04/18/2017     10/29/2020       CMP  Lab Results   Component Value Date    CALCIUM 8.8 04/26/2018    LABALBU 4.3 04/26/2018    PROT 6.6 04/26/2018     04/26/2018    K 4.7 04/26/2018    CO2 26 04/26/2018     04/26/2018    BUN 16 10/29/2020    CREATININE 0.7 10/29/2020    ALT 20 04/26/2018    AST 17 04/26/2018       HgBA1c: No components found for: HGBA1C    Lab Results   Component Value Date    TSH 3.450 05/07/2021     Lab Results   Component Value Date    VITD25 25 05/07/2021     Lab Results   Component Value Date    SEDRATE 12 10/29/2020     Lab Results   Component Value Date    CRP < 0.03 10/29/2020       AKANKSHA: negative (IFA)  DsDNA 189 (<302)  [place] <1  U1RNP 1 (<7)  RNP70 (<1) (<7)  SSA < 1 (<7)  SSB < 1 (<7)  CENP < 1 (<7)  Penny-1 < 1 (<7)  RF IgM < 1 (<3.5)  RF IgA 3 (<14)  CCP IgG 1 (< 7)  MCV 9  (< 20)  Cardiolipin IgM 2 (<20)  Cardiolipin IgG < 3 (< 20)  B2GP IgM < 1 (<21)  B2GP IgG < 6 (<21)  Thyroglobulin IgG 48 (40-60 equivocal)  TPO IgG 615 (> 35 positive)       CT A/P    2015 2017      MRI pelvis : jan 9, 2021. DEXA:  11-     BMD T-score  Z-sore   Right femoral neck 0.613 -2.1 -1   Left femoral neck 0.99 -1.4 -0.2   Lumbar spine  0.772 -2.20 -1.10           Assessment/ Plan:  Generalized joint pains in the fingers, wrist, elbows, shoulders, hips, knees, ankles, feet, neck and back. Most severe pain lower back.  Symptoms started: Around 2015 with the development of arthralgias affecting the shoulders, elbows, fingers, hips, knees along with generalized myofascial tenderness of the upper and lower extremities. Occasional shooting pain from lower back to the anterior thigh. Timing: mornings and evenings after sitting down. Aggravating factors: weather changes. Inactivity. ,   Pressures along the upper arms and lower legs. Alleviating factors: Ibuprofen 800mg qd (moderate to a lot relief). (+) dry mouth/Dry eyes. - exam with wide spread tenderness, 14/18 FMS tender point. (+) lower back tenderness  Previously treated by  Dr. Valentín Lockhart 2016 for possible  Lupus like syndrome vs Sjogren syndrome based upon her polyarthralgia's/myalgia's that improved with the joint pains. She was started on plaquenil 200mg qd and then transitioned to bid dosing w/ ?  Relief. # undiff axial spondylaorthritis. -   prior CT A/P 2017 and 2015 with evidence of SI joint and pubic symphysis changes. + cystica and ? erosive changes. (have asked radiology to review 12/21/2020)  , sx's with some inflammatory back symptoms, + gelling, relief with movement & NSAIDs, mild AM stiffness (15 minutes) - prior therapy Mobic, plaquenil,  Tylenol (no relief), Naproxen (no relief), prednisone taper (significant relief) . MRI pelvis with sacroilitis. -     Had a prolonged discussion regarding the potential treatment options for the undifferentiated axial spondyloarthropathy including anti-TNF's, IL-17 inhibitors and Enrique inhibitors. Given her history we discussed possible pursue anti-TNF and interleukin-17 inhibitors. Information on Toltz, Enbrel and Humira have been provided. -     TB gold in prepration to start anti-TNF therapy Humria. - - cont. Tumeric daily     Patient was to have baseline TB testing prior to initiation of medication. # De Quervain's tenosynovitis. :  Active    # Planter fasciitis: Active bilaterally. Can be related to #1.       #  Osteoporosis: - treated by Gynecologist  - previously treatment --- Alendronate 70mg q wk x 3 years    #  Medication monitoring - need tb gold and hepatitis panel prior to starting ant-TNF therapy     No follow-ups on file. Electronically signed by Roly Poe DO on 4/6/2022 at 3:30 PM      Thank you for allowing me to participate in the care of this patient. Please call if there are any questions.

## 2022-04-06 NOTE — PATIENT INSTRUCTIONS
Patient Education        etanercept  Pronunciation: ee TAN er sept  Brand: Enbrel, Erelzi Prefilled Syringe, Erelzi Sensoready Pen  What is the most important information I should know about etanercept? Etanercept affects your immune system. You may get infections more easily, even serious or fatal infections. Call your doctor if you have signs of infection (fever, cough, night sweats, pale skin, bruising or bleeding, loss of appetite, weight loss, feeling verytired). Using etanercept may increase your risk of developing certain types of cancer, including lymphoma. Ask your doctor about your specific risk. What is etanercept? Etanercept is a tumor necrosis factor (TNF) blocker that is used in adults to prevent joint damage caused by rheumatoid arthritis, psoriatic arthritis, orankylosing spondylitis. Etanercept is also used to treat plaque psoriasis in adults and children atleast 3years old. Etanercept is used to treat polyarticular juvenile idiopathic arthritis inchildren who are at least 3years old. Etanercept is sometimes used with another medicine called methotrexate. Etanercept may also be used for purposes not listed in this medication guide. What should I discuss with my healthcare provider before using etanercept? You should not use etanercept if you are allergic to it, or if you have asevere infection such as sepsis (infection throughout your body). Tell your doctor if you have any signs of infection, such as:    fever, chills, sweats, flu-like symptoms, feeling very tired;   cough, shortness of breath, coughing up blood;   diarrhea, weight loss;   skin warmth or redness, open sores; or   increased urination, burning when you urinate.   Tell your doctor if you have ever had:   a weak immune system, HIV, tuberculosis;   hepatitis B;   diabetes;   congestive heart failure;   a nerve disorder such as multiple sclerosis or Guillain-Barré syndrome;   seizures;   a latex allergy; mix and store the medicine. Etanercept doses are based on weight in children. Your child's dose needs maychange if the child gains or loses weight. If you need surgery, tell the surgeon ahead of time that you are usingetanercept. Etanercept affects your immune system. You may get infections more easily, even serious or fatal infections. Your doctor will need to examine you on a regular basis. If you've ever had hepatitis B, using etanercept can cause this virus to become active or get worse. You may need frequent liver function tests while using this medicine and forseveral months after you stop. Carefully follow all storage instructions provided with your medicine. Cartridges, injection pens, prefilled syringes, vials, and diluent are stable at specific temperatures for only a certain number of days or weeks. Throw awayany medicine not used within that time. Keep unopened etanercept in its original carton in the refrigerator. Protect from light. Do not freeze. Do not use after the expiration date on the labelhas passed. You may also store etanercept at room temperature for a short time. Enbrel may be kept at room temperature for up to 14 days. Olive [place] may be kept at room temperature for up to 28 days. If you store your medicine at room temperature, throw it away after the length of time stated for thebrand you are using. Protect the medicine from light and extreme hot or cold temperatures. Once the medicine has reached room temperature, do not put it back into the refrigerator. Each cartridge, injection pen, or prefilled syringe is for one use only. Throw it away after one use, even if there is stillmedicine left inside. Use a needle and syringe only once and then place them in a puncture-proof \"sharps\" container. Follow state or local laws about how to dispose of thiscontainer. Keep it out of the reach of children and pets. What happens if I miss a dose?   Call your doctor for instructions if you miss a dose of etanercept. What happens if I overdose? Seek emergency medical attention or call the Poison Help line at 1-215.476.4800. What should I avoid while using etanercept? Avoid injecting etanercept into skin that is bruised, tender, red, or hard. Do not receive a \"live\" vaccine while using etanercept. The vaccine may not work as well and may not fully protect you from disease. Live vaccines include measles, mumps, rubella (MMR), polio, rotavirus, typhoid,yellow fever, varicella (chickenpox), and zoster (shingles). Avoid being near people who are sick or have infections. Call your doctor for preventive treatment if you are exposed to chickenpox or measles. Theseconditions can be serious or even fatal in people who are using etanercept. What are the possible side effects of etanercept? Get emergency medical help if you have signs of an allergic reaction: hives; difficult breathing; swelling of your face, lips, tongue, or throat. Serious and sometimes fatal infections may occur.    Call your doctor right away if you have:   fever, chills, flu symptoms;   pale skin, easy bruising or bleeding;   pain, redness, or swelling where etanercept was injected (for longer than 5 days after injection);   signs of lymphoma --fever, night sweats, weight loss, stomach pain or swelling, swollen glands (in your neck, armpits, or groin);   signs of tuberculosis --cough, night sweats, loss of appetite, weight loss, feeling very tired;   new or worsening psoriasis --skin redness or scaly patches, raised bumps filled with pus;   nerve problems --dizziness, numbness or tingling, problems with vision, or weak feeling in your arms or legs;   signs of heart failure --shortness of breath, swelling in your lower legs;   lupus-like syndrome --joint pain or swelling, chest discomfort, feeling short of breath, skin rash on your cheeks or arms (worsens in sunlight); o   liver problems --right-sided upper stomach pain, vomiting, tiredness, loss of appetite, yellowing of your skin or eyes. Common side effects may include:   pain, swelling, itching, or redness where the medicine was injected; or   cold symptoms such as stuffy nose, sneezing, sore throat. This is not a complete list of side effects and others may occur. Call your doctor for medical advice about side effects. You may report side effects toFDA at 8-002-XTP-3055. What other drugs will affect etanercept? Tell your doctor about all your other medicines, especially:   abatacept (Orencia);   anakinra (Kineret);   cyclophosphamide (Cytoxan); or   insulin or oral diabetes medicine. This list is not complete. Other drugs may affect etanercept, including prescription and over-the-counter medicines, vitamins, and herbal products. Notall possible drug interactions are listed here. Where can I get more information? Your doctor or pharmacist can provide more information about etanercept. Remember, keep this and all other medicines out of the reach of children, never share your medicines with others, and use this medication only for the indication prescribed. Every effort has been made to ensure that the information provided by Carolyn Pacheco Dr is accurate, up-to-date, and complete, but no guarantee is made to that effect. Drug information contained herein may be time sensitive. [employer] information has been compiled for use by healthcare practitioners and consumers in the [place] and therefore [employer] does not warrant that uses outside of the [place] are appropriate, unless specifically indicated otherwise. [facility]'s drug information does not endorse drugs, diagnose patients or recommend therapy.  [employer] drug information is an informational resource designed to assist licensed healthcare practitioners in caring for their patients and/or to serve consumers viewing this service as a supplement to, and not a substitute for, the expertise, skill, knowledge and judgment of healthcare practitioners. The absence of a warning for a given drug or drug combination in no way should be construed to indicate that the drug or drug combination is safe, effective or appropriate for any given patient. [facility] does not assume any responsibility for any aspect of healthcare administered with the aid of information [facility] provides. The information contained herein is not intended to cover all possible uses, directions, precautions, warnings, drug interactions, allergic reactions, or adverse effects. If you have questions about the drugs you are taking, check with yourdoctor, nurse or pharmacist.  Copyright 7932-6980 [address]: 20.01. Revision date:2/19/2021. Care instructions adapted under license by [facility] ([facility]). If you have questions about a medical condition or this instruction, always ask your healthcare professional. [place] [address] any warranty or liability for your use of this information. Patient Education        ixekizumab  Pronunciation: IX ee KIZ ue mab  ManoloRodman Jaime Autoinjector, Taltz Prefilled Syringe  What is the most important information I should know about ixekizumab? Ixekizumab affects your immune system. You may get infections more easily, even serious or fatal infections. Call your doctor if you have a fever, chills, sweating, muscle pain, weight loss, skin sores, shortness of breath, cough with red or pink mucus, increasedurination, or painful urination. Your doctor may perform tests to make sure you do not have tuberculosis orother infections. What is ixekizumab? Ixekizumab is an immunosuppressant that is used to treat plaque psoriasis inadults and children at least 10years old. Ixekizumab is used in adults to treat active psoriatic arthritis or activeankylosing spondylitis. Ixekizumab is also used in adults to treat axial spondyloarthritis.   Ixekizumab may also be used for purposes not listed in this medication guide. What should I discuss with my healthcare provider before using ixekizumab? You should not use ixekizumab if you are allergic to it. Tell your doctor if you have ever had:   an active or recent infection;   Crohn's disease, ulcerative colitis; or   tuberculosis (or if you have close contact with someone who has tuberculosis). Tell your doctor if you are pregnant or breastfeeding. Ixekizumab is not approved for use by anyone younger than 10years old. How is ixekizumab given? Before you start treatment with ixekizumab, your doctor may perform tests tomake sure you do not have tuberculosis or other infections. Follow all directions on your prescription label and read all medication guidesor instruction sheets. Use the medicine exactly as directed. Ixekizumab is injected under the skin. A healthcare provider may teach you howto properly use the medication by yourself. The timing of your injections will depend on the condition being treated. Follow your doctor's dosing instructions very carefully. Read and carefully follow any Instructions for Use provided with your medicine. Ask your doctor or pharmacist if you don't understand all instructions. This medicine is not used daily. Your first dose may be given as 2 injections at the same time. Later doses arethen given once every 2 to 4 weeks. Ixekizumab doses are based on weight in children treated for moderate-to-severe plaque psoriasis. Your child's dose needs may change if the child gains orloses weight. Prepare an injection only when you are ready to use it. Ixekizumab should appear as a clear to light-yellow liquid. Do not use the medicine if it looks cloudy, has changed colors, or has particles in it. Call your pharmacist for new medication. Do not shake the syringe or injection pen. Your healthcare provider will show you where on your body to inject ixekizumab. Use a different place each time you give an injection.  Do not inject into the same place two times in a row. Do not give an injection into a skin area with active psoriasis, or skin that is red, bruised, or tender. Ixekizumab can increase your risk of bleeding or infection. You will need frequent medical tests. Store this medicine in the original container in a refrigerator. Protect from light and do not freeze. Do not use the medicine if it has become frozen. Before injecting your dose, take the injection pen or prefilled syringe out of the refrigerator and leave it at room temperature for 30 minutes. Do not heat the medicine in a microwave or under hot water, and do not leave it in direct sunlight. If needed, you may store ixekizumab at room temperature in its original carton, protected from light. Do not return the medicine to the refrigerator. Throw away any ixekizumab kept at room temperature if you have not used it within 5days. Each single-use injection pen or prefilled syringe is for one use only. Michela Peper away after one use, even if there is still medicine left inside. Use a needle and syringe only once and then place them in a puncture-proof \"sharps\" container. Follow state or local laws about how to dispose of thiscontainer. Keep it out of the reach of children and pets. What happens if I miss a dose? Use the medicine as soon as you can, but skip the missed dose if it is almost time for your next dose. Do not use two doses at one time. What happens if I overdose? Seek emergency medical attention or call the Poison Help line at 1-769.806.7853. What should I avoid while using ixekizumab? Do not share this medicine with another person, even if they have the same symptoms you have. Do not receive a \"live\" vaccine while using ixekizumab. Live vaccines include measles, mumps, rubella (MMR), rotavirus, typhoid, yellow fever, varicella (chickenpox), zoster (shingles), and nasal flu(influenza) vaccine. What are the possible side effects of ixekizumab?   Get emergency medical help if you have signs of an allergic reaction: rash; chest tightness, difficult breathing; feeling like you might pass out;swelling of your face, eyelids, lips, mouth, tongue, or throat. Call your doctor at once if you have:   diarrhea (may be bloody), stomach cramps;   easy bruising, purple or red spots under your skin;   fever, chills, muscle pain;   painful skin sores;   cough, shortness of breath, cough with red or pink mucus;   increased urination, pain or burning when you urinate;   sores or white patches in your mouth or throat (yeast infection or \"thrush\"); or   signs of tuberculosis: fever, cough, night sweats, loss of appetite, weight loss, and feeling very tired. Common side effects may include:   pain or redness where the medicine was injected;   nausea;   fungal infections; or   cold symptoms such as stuffy nose, sneezing, sore throat. This is not a complete list of side effects and others may occur. Call your doctor for medical advice about side effects. You may report side effects toFDA at 9-525-FDA-7668. What other drugs will affect ixekizumab? Other drugs may affect ixekizumab, including prescription and over-the-counter medicines, vitamins, and herbal products. Tell your doctor about all yourcurrent medicines and any medicine you start or stop using. Where can I get more information? Your pharmacist can provide more information about ixekizumab. Remember, keep this and all other medicines out of the reach of children, never share your medicines with others, and use this medication only for the indication prescribed. Every effort has been made to ensure that the information provided by Carolyn Pacheco Dr is accurate, up-to-date, and complete, but no guarantee is made to that effect. Drug information contained herein may be time sensitive.  [employer] information has been compiled for use by healthcare practitioners and consumers in the [place] and therefore [facility] does not warrant that uses outside of the [place] are appropriate, unless specifically indicated otherwise. [facility]'s drug information does not endorse drugs, diagnose patients or recommend therapy. [facility]'s drug information is an informational resource designed to assist licensed healthcare practitioners in caring for their patients and/or to serve consumers viewing this service as a supplement to, and not a substitute for, the expertise, skill, knowledge and judgment of healthcare practitioners. The absence of a warning for a given drug or drug combination in no way should be construed to indicate that the drug or drug combination is safe, effective or appropriate for any given patient. [facility] does not assume any responsibility for any aspect of healthcare administered with the aid of information [facility] provides. The information contained herein is not intended to cover all possible uses, directions, precautions, warnings, drug interactions, allergic reactions, or adverse effects. If you have questions about the drugs you are taking, check with yourdoctor, nurse or pharmacist.  Copyright 1931-9365 [address] [place] [address] Dr CARSON. Version: 4.01. Revision date: 6/18/2020. Care instructions adapted under license by [facility] ([facility]). If you have questions about a medical condition or this instruction, always ask your healthcare professional. [place] [address] any warranty or liability for your use of this information.

## 2022-05-16 RX ORDER — ASPIRIN 325 MG
325 TABLET ORAL EVERY OTHER DAY
COMMUNITY

## 2022-06-02 ENCOUNTER — TELEPHONE (OUTPATIENT)
Dept: FAMILY MEDICINE CLINIC | Age: 62
End: 2022-06-02

## 2022-06-02 NOTE — TELEPHONE ENCOUNTER
Outside labs reviewed  BMP okay  LFTs okay  Ferritin okay  T4 okay/total T3 okay  CBC okay  Labs overall okay. If patient needs in relatively quickly, would recommend appointment with WS/TS, as with my current transition, I would not be able to see patient until at least July 2022. Let me know.   ES

## 2022-06-02 NOTE — TELEPHONE ENCOUNTER
----- Message from Brooks Douglas sent at 6/2/2022  1:53 PM EDT -----  Subject: Results Request    QUESTIONS  Which lab or imaging result is the patient calling about? blood work   Which provider ordered the test? Rodney Camara   At what location was the test performed? [facility]  Date the test was performed? 2022-03-17  Additional Information for Provider?   ---------------------------------------------------------------------------  --------------  CALL BACK INFO  What is the best way for the office to contact you? OK to leave message on   voicemail  Preferred Call Back Phone Number? [phone]  ---------------------------------------------------------------------------  --------------  SCRIPT ANSWERS  Relationship to Patient?  Self

## 2022-06-02 NOTE — TELEPHONE ENCOUNTER
Called pt to get more info on this message. She states that she wants to schedule an appt with only you. States that [facility] to drop off her lab results and to schedule an appointment. States that they would not schedule her with you. (there are no appts available until April 2023)  I offered to have her scheduled with a resident on a day you would be precepting and she declined. She asked that a message be sent to you to accommodate. States that our office cancelled her previously scheduled appointment.  (4/11/22)

## 2022-06-03 NOTE — TELEPHONE ENCOUNTER
Per ES, will be willing to work patient into schedule later in July, possibly on a Friday morning. This will be a one time courtesy. Will call pt later next week with confirmed date. Patient notified of this. Await ES.

## 2022-06-03 NOTE — TELEPHONE ENCOUNTER
Patient notified. She is asking for another message to be sent to ES to further discuss scheduling. I have sent a direct message to  regarding this.

## 2022-06-04 ENCOUNTER — TELEPHONE (OUTPATIENT)
Dept: FAMILY MEDICINE CLINIC | Age: 62
End: 2022-06-04

## 2022-06-04 DIAGNOSIS — U07.1 COVID-19: Primary | ICD-10-CM

## 2022-06-04 NOTE — TELEPHONE ENCOUNTER
Patient contacted this on call physician on 6/4/22 at 12:45pm stating that she was exposed to her son with Abbey on 6/1 (he had a cough but didn't know he had COVID at the time) and she tested positive today. Last night she had a sore throat so drank more water. Today she has fatigue, chills, and a slight headache. No cough or SOB. Her son was just prescribed Paxlovid and she would like to try it to help prevent her symptoms from worsening. Rx was sent to [facility].  She was advised to isolate, rest, ambulate frequently, increase her water intake, take vitamins C, D, and zinc, and to use OTC symptomatic medication. She was encouraged to go to the ER with chest pain or SOB (pulse ox < 90%). She verbalized understanding and was in agreement with the plan of care. Electronically signed by Heena Salvador MD on 6/4/22.

## 2022-06-07 ENCOUNTER — TELEPHONE (OUTPATIENT)
Dept: FAMILY MEDICINE CLINIC | Age: 62
End: 2022-06-07

## 2022-06-07 NOTE — TELEPHONE ENCOUNTER
Nickolas Crigler needs to be tested for covid. Took home test and it came back positive. For work she needs an actual test done.  She is going to cancel vv and I will have her go to [facility] or ER

## 2022-07-08 ENCOUNTER — OFFICE VISIT (OUTPATIENT)
Dept: FAMILY MEDICINE CLINIC | Age: 62
End: 2022-07-08
Payer: COMMERCIAL

## 2022-07-08 VITALS
OXYGEN SATURATION: 98 % | HEIGHT: 65 IN | BODY MASS INDEX: 29.82 KG/M2 | DIASTOLIC BLOOD PRESSURE: 80 MMHG | RESPIRATION RATE: 16 BRPM | WEIGHT: 179 LBS | TEMPERATURE: 97 F | HEART RATE: 73 BPM | SYSTOLIC BLOOD PRESSURE: 112 MMHG

## 2022-07-08 DIAGNOSIS — Z00.00 WELL ADULT EXAM: Primary | ICD-10-CM

## 2022-07-08 DIAGNOSIS — Z13.220 LIPID SCREENING: ICD-10-CM

## 2022-07-08 DIAGNOSIS — E55.9 VITAMIN D DEFICIENCY: ICD-10-CM

## 2022-07-08 DIAGNOSIS — E03.9 HYPOTHYROIDISM, UNSPECIFIED TYPE: ICD-10-CM

## 2022-07-08 DIAGNOSIS — D50.9 IRON DEFICIENCY ANEMIA, UNSPECIFIED IRON DEFICIENCY ANEMIA TYPE: ICD-10-CM

## 2022-07-08 DIAGNOSIS — Z00.00 LABORATORY EXAM ORDERED AS PART OF ROUTINE GENERAL MEDICAL EXAMINATION: ICD-10-CM

## 2022-07-08 DIAGNOSIS — K21.9 GASTROESOPHAGEAL REFLUX DISEASE, UNSPECIFIED WHETHER ESOPHAGITIS PRESENT: ICD-10-CM

## 2022-07-08 DIAGNOSIS — M85.80 OSTEOPENIA, UNSPECIFIED LOCATION: ICD-10-CM

## 2022-07-08 PROCEDURE — 99396 PREV VISIT EST AGE 40-64: CPT | Performed by: FAMILY MEDICINE

## 2022-07-08 RX ORDER — AMPICILLIN TRIHYDRATE 250 MG
1 CAPSULE ORAL DAILY
COMMUNITY

## 2022-07-08 RX ORDER — LEVOTHYROXINE SODIUM 0.07 MG/1
TABLET ORAL
Qty: 90 TABLET | Refills: 3 | Status: CANCELLED | OUTPATIENT
Start: 2022-07-08

## 2022-07-08 SDOH — ECONOMIC STABILITY: FOOD INSECURITY: WITHIN THE PAST 12 MONTHS, THE FOOD YOU BOUGHT JUST DIDN'T LAST AND YOU DIDN'T HAVE MONEY TO GET MORE.: NEVER TRUE

## 2022-07-08 SDOH — ECONOMIC STABILITY: FOOD INSECURITY: WITHIN THE PAST 12 MONTHS, YOU WORRIED THAT YOUR FOOD WOULD RUN OUT BEFORE YOU GOT MONEY TO BUY MORE.: NEVER TRUE

## 2022-07-08 ASSESSMENT — ENCOUNTER SYMPTOMS
NAUSEA: 0
ABDOMINAL PAIN: 0
SHORTNESS OF BREATH: 0
CHEST TIGHTNESS: 0
BLOOD IN STOOL: 0
DIARRHEA: 0
ANAL BLEEDING: 0
CONSTIPATION: 0
VOMITING: 0

## 2022-07-08 ASSESSMENT — PATIENT HEALTH QUESTIONNAIRE - PHQ9
SUM OF ALL RESPONSES TO PHQ QUESTIONS 1-9: 0
1. LITTLE INTEREST OR PLEASURE IN DOING THINGS: 0
SUM OF ALL RESPONSES TO PHQ QUESTIONS 1-9: 0
SUM OF ALL RESPONSES TO PHQ QUESTIONS 1-9: 0
2. FEELING DOWN, DEPRESSED OR HOPELESS: 0
SUM OF ALL RESPONSES TO PHQ QUESTIONS 1-9: 0
SUM OF ALL RESPONSES TO PHQ9 QUESTIONS 1 & 2: 0

## 2022-07-08 ASSESSMENT — SOCIAL DETERMINANTS OF HEALTH (SDOH): HOW HARD IS IT FOR YOU TO PAY FOR THE VERY BASICS LIKE FOOD, HOUSING, MEDICAL CARE, AND HEATING?: NOT HARD AT ALL

## 2022-07-08 NOTE — PROGRESS NOTES
Bharat Hallman is a 63 y. o.female    Pt presents for annual wellness physical exam.      Patient also presents for follow-up of Hypothyroidism, Vitamin D Deficiency, Anemia, Osteopenia, and GERD.     Patient continues seeing Dr. Yusuf Kim for osteoarthritis-to follow-up 7/13/2022  Pt continues seeing Dr. Raghu Alicea for Iron Deficiency Anemia.   Patient previously saw Dr. Delma Crowley in [place], [place] for elevated thyroid antibodies-diagnosed with hypothyroidism due to Hashimoto's thyroiditis- no further follow up scheduled. Glucometer readings at home are not needed. The home BP readings have not been checked regularly. Wt Readings from Last 3 Encounters:   07/08/22 179 lb (81.2 kg)   04/06/22 189 lb (85.7 kg)   07/21/21 188 lb 11.2 oz (85.6 kg)   Weight decreased 8# since last visit 15 months ago. Pt stable since last visit- no new problems for diagnoses listed below:  Patient Active Problem List   Diagnosis    Lactose intolerance    Iron deficiency anemia    Diplopia    Osteoporosis    Hypothyroidism    Gastroesophageal reflux disease    Anal fistula     Review of Systems   Constitutional: Positive for chills (\"always\"- chronic) and fatigue. Negative for diaphoresis, fever and unexpected weight change. Eyes: Negative for visual disturbance. Respiratory: Negative for chest tightness and shortness of breath. Cardiovascular: Positive for leg swelling (occasionally with persistent standing. ). Negative for chest pain and palpitations. Gastrointestinal: Negative for abdominal pain, anal bleeding, blood in stool, constipation, diarrhea, nausea and vomiting. Genitourinary: Negative for dysuria and hematuria. Musculoskeletal: Negative for neck pain. Neurological: Positive for light-headedness (with position changes) and headaches (worse over past 2 days- limited water intake. ). Negative for dizziness.      OBJECTIVE     /80 (Site: Left Upper Arm, Position: Sitting, Cuff Size: Medium Adult)   Pulse 73   Temp 97 °F (36.1 °C) (Skin)   Resp 16   Ht 5' 5\" (1.651 m)   Wt 179 lb (81.2 kg)   LMP  (Exact Date)   SpO2 98%   Breastfeeding No   BMI 29.79 kg/m²     Wt Readings from Last 3 Encounters:   07/08/22 179 lb (81.2 kg)   04/06/22 189 lb (85.7 kg)   07/21/21 188 lb 11.2 oz (85.6 kg)     Physical Exam  Vitals and nursing note reviewed. Constitutional:       Appearance: She is well-developed. HENT:      Head: Normocephalic and atraumatic. Right Ear: External ear normal.      Left Ear: External ear normal.      Nose: Nose normal.   Eyes:      Conjunctiva/sclera: Conjunctivae normal.      Pupils: Pupils are equal, round, and reactive to light. Cardiovascular:      Rate and Rhythm: Normal rate and regular rhythm. Heart sounds: Normal heart sounds. Pulmonary:      Effort: Pulmonary effort is normal.      Breath sounds: Normal breath sounds. Abdominal:      General: Bowel sounds are normal.      Palpations: Abdomen is soft. Musculoskeletal:         General: Normal range of motion. Cervical back: Normal range of motion and neck supple. Skin:     General: Skin is warm and dry. Neurological:      Mental Status: She is alert and oriented to person, place, and time. Deep Tendon Reflexes: Reflexes are normal and symmetric. Psychiatric:         Behavior: Behavior normal.         Thought Content:  Thought content normal.         Judgment: Judgment normal.                 Lab Results   Component Value Date    CHOL 128 05/07/2021    TRIG 59 05/07/2021    HDL 68 05/07/2021    LDLCALC 48 05/07/2021       Immunization History   Administered Date(s) Administered    COVID-19, PFIZER PURPLE top, DILUTE for use, (age 15 y+), 30mcg/0.3mL 03/04/2021, 03/25/2021, 12/22/2021    Meningococcal MCV4P (Menactra) 09/09/2013    Tdap (Boostrix, Adacel) 05/08/2020, 01/17/2022    Zoster Recombinant (Shingrix) 01/17/2022, 07/01/2022     Health Maintenance   Topic Date Due  Diabetes screen  02/25/2019    Depression Screen  04/08/2022    Flu vaccine (1) 09/01/2022    Breast cancer screen  03/25/2024    Cervical cancer screen  11/18/2024    Lipids  05/07/2026    Colorectal Cancer Screen  12/02/2029    DTaP/Tdap/Td vaccine (3 - Td or Tdap) 01/17/2032    Shingles vaccine  Completed    COVID-19 Vaccine  Completed    Hepatitis A vaccine  Aged Out    Hepatitis B vaccine  Aged Out    Hib vaccine  Aged Out    Meningococcal (ACWY) vaccine  Aged Out    Pneumococcal 0-64 years Vaccine  Aged Out    Hepatitis C screen  Discontinued    HIV screen  Discontinued     CT Lung Screen (50-80, 20 pk-yrs, smoking or quit <15 years) indicated at this time? No tobacco history  Sleep Medicine referral indicated at this time (Obesity, Snoring, Daytime Somnolence, Apneic Episodes)? Patient denies any current symptoms    Future Appointments   Date Time Provider Melecio Morrison   7/13/2022  3:00 PM Rhina Beverage, DO N [facility]     ASSESSMENT       Diagnosis Orders   1. Well adult exam     2. Hypothyroidism, unspecified type  T4, Free    TSH   3. Vitamin D deficiency  Vitamin D 25 Hydroxy   4. Iron deficiency anemia, unspecified iron deficiency anemia type     5. Osteopenia, unspecified location  DEXA BONE DENSITY AXIAL SKELETON   6. Gastroesophageal reflux disease, unspecified whether esophagitis present     7. Laboratory exam ordered as part of routine general medical examination  Lipid Panel    T4, Free    TSH   8. Lipid screening  Lipid Panel       PLAN      Encouraged increased water intake, drinking > 80 ounces daily. Check FLP, VIT D 25 OH, and FREE T4/ TSH at this time.   Follow-up with Dr. Kate Dubose as planned.  (updated 7/8/2022)   Follow-up with Dr. Ken Maldonado as planned (updated 7/8/2022)   Follow-up with Endocrinology (Dr. Mayra Adame) as planned (updated 7/8/2022)  Continue current medications  No refills needed at this time  Follow up in 12 months.               Preventive Health Topics:  Encouraged updated COVID VACCINE booster this fall. Encouraged FLU VACCINE after October 1st annually- pt declines. PAP/ PELVIC management per Dr. Matti Resendiz last appt 4/2022- to follow up in their office 7/21/2022  MAMMO due after 3/25/2023  COLONOSCOPY done 12/2/2019 per Dr. Emanuel Height- to do again in 2029.  (updated 7/8/2022)  EGD done 12/2/2019 per Dr. Jenae Howard- GERD, Inactive Gastritis- to follow up PRN.   Anemia most likely due to previous Haris-En- Y Bypass.  (updated 7/8/2022)  DEXA done 11/19/2019 per Dr. Judd Snider with negative FRAX- to continue Calcium with Vitamin D supplementation and weight bearing exercise-to do again at this time- will order. (updated 7/8/2022)         Electronically signed Omar Daigle MD on 7/8/2022 at 10:14 AM

## 2022-07-08 NOTE — PATIENT INSTRUCTIONS
Encouraged increased water intake, drinking > 80 ounces daily. Check FLP, VIT D 25 OH, and FREE T4/ TSH at this time.   Follow-up with Dr. Cecily Keller as planned. (updated 7/8/2022)   Follow-up with Dr. Juanita Dangelo as planned (updated 7/8/2022)   Follow-up with Endocrinology (Dr. Kenisha Crandall) as planned (updated 7/8/2022)  Continue current medications  No refills needed at this time  Follow up in 12 months.             Preventive Health Topics:  Encouraged updated COVID VACCINE booster this fall. Encouraged FLU VACCINE after October 1st annually- pt declines. PAP/ PELVIC management per Dr. Marie Velazquez last appt 4/2022- to follow up in their office 7/21/2022  MAMMO due after 3/25/2023  COLONOSCOPY done 12/2/2019 per Dr. Antony Aponte- to do again in 2029.  (updated 7/8/2022)  EGD done 12/2/2019 per Dr. Antony Aponte- GERD, Inactive Gastritis- to follow up PRN.   Anemia most likely due to previous Haris-En- Y Bypass.  (updated 7/8/2022)  DEXA done 11/19/2019 per Dr. Guido Uriostegui with negative FRAX- to continue Calcium with Vitamin D supplementation and weight bearing exercise-to do again at this time- will order. (updated 7/8/2022)

## 2022-07-13 ENCOUNTER — OFFICE VISIT (OUTPATIENT)
Dept: RHEUMATOLOGY | Age: 62
End: 2022-07-13
Payer: COMMERCIAL

## 2022-07-13 VITALS
WEIGHT: 179.2 LBS | BODY MASS INDEX: 29.85 KG/M2 | HEIGHT: 65 IN | HEART RATE: 87 BPM | OXYGEN SATURATION: 99 % | DIASTOLIC BLOOD PRESSURE: 68 MMHG | SYSTOLIC BLOOD PRESSURE: 98 MMHG

## 2022-07-13 DIAGNOSIS — M15.9 OSTEOARTHRITIS OF MULTIPLE JOINTS, UNSPECIFIED OSTEOARTHRITIS TYPE: ICD-10-CM

## 2022-07-13 DIAGNOSIS — M65.4 DE QUERVAIN'S DISEASE (RADIAL STYLOID TENOSYNOVITIS): ICD-10-CM

## 2022-07-13 DIAGNOSIS — M47.818 SI JOINT ARTHRITIS: ICD-10-CM

## 2022-07-13 DIAGNOSIS — M46.90 AXIAL SPONDYLOARTHRITIS (HCC): Primary | ICD-10-CM

## 2022-07-13 PROCEDURE — 99214 OFFICE O/P EST MOD 30 MIN: CPT | Performed by: INTERNAL MEDICINE

## 2022-07-13 PROCEDURE — 1036F TOBACCO NON-USER: CPT | Performed by: INTERNAL MEDICINE

## 2022-07-13 PROCEDURE — G8417 CALC BMI ABV UP PARAM F/U: HCPCS | Performed by: INTERNAL MEDICINE

## 2022-07-13 PROCEDURE — G8427 DOCREV CUR MEDS BY ELIG CLIN: HCPCS | Performed by: INTERNAL MEDICINE

## 2022-07-13 PROCEDURE — 3017F COLORECTAL CA SCREEN DOC REV: CPT | Performed by: INTERNAL MEDICINE

## 2022-07-13 ASSESSMENT — ENCOUNTER SYMPTOMS
RESPIRATORY NEGATIVE: 1
ABDOMINAL PAIN: 0
GASTROINTESTINAL NEGATIVE: 1
WHEEZING: 0
EYES NEGATIVE: 1
SHORTNESS OF BREATH: 0
COUGH: 0
CONSTIPATION: 0
EYE PAIN: 0
VOMITING: 0
COLOR CHANGE: 0
NAUSEA: 0
EYE REDNESS: 0

## 2022-07-13 NOTE — PROGRESS NOTES
[facility] RHEUMATOLOGY FOLLOW UP NOTE       Date Of Service: 7/13/2022  Provider: Kwabena Ventura DO ,   PCP: Amilcar Taylor MD   Name: Brenda Romeo   MRN: [id number]        History of Present Illness (HPI)     Chief Complaint   Patient presents with    3 Month Follow-Up        Brenda Romeo  is a(n)61 y.o. female with a hx of *female with a hx of Iron def anemia, hypothyroidism, Vitamin d def, osteoporosis (t-score -2.6 in 8/2014)   here for follow-up evaluation of. Undifferentiated axial spondyloarthropathy, history of fibromyalgia, osteoporosis, deq tenosynovitis, plantar fasciitis. Taking tumeric 2000mg daily, and coq 10 - decreased arthralgia , and lower extremity muscle pain since the last evaluation. Mil active pain in the left hand (cmc), left wrist, elbow bilateral (mild) , shoulders (bilateral),  bilat hips, neck and lower. Timing: mornings. Aggravating:  standing, walking, mopping. , prolonged sitting. Alleviating: tumeric 2000mg daily , massage the area. Patient denies any associated  with  radicular pain, numbness, am stiffness         REVIEW OF SYSTEMS: (ROS)    Review of Systems   Constitutional: Negative. Negative for fatigue, fever and unexpected weight change. HENT: Negative. Negative for congestion and mouth sores. Eyes: Negative. Negative for pain and redness. Respiratory: Negative. Negative for cough, shortness of breath and wheezing. Cardiovascular: Negative. Negative for chest pain and leg swelling. Gastrointestinal: Negative. Negative for abdominal pain, constipation, nausea and vomiting. Endocrine: Negative for polyuria. Genitourinary: Negative. Negative for difficulty urinating, frequency and hematuria. Skin: Negative. Negative for color change and rash. Neurological: Negative. Negative for dizziness, weakness, numbness and headaches. Hematological: Negative. Negative for adenopathy. Does not bruise/bleed easily. Psychiatric/Behavioral: Negative. Negative for agitation and sleep disturbance. The patient is not nervous/anxious. PmHx:  has a past medical history of Dry eyes, Hypothyroid, Iron deficiency anemia, Lactose intolerance, Osteopenia, Polyarthralgia, and Vitamin D deficiency. Social History:  reports that she has never smoked. She has never used smokeless tobacco. She reports that she does not drink alcohol and does not use drugs. Allergies   Allergen Reactions    Lactose Other (See Comments)     Bloating and Constipation.  Pcn [Penicillins] Hives    Ciprofloxacin Hcl Rash       CURRENT MEDICATIONS      Current Outpatient Medications:     Coenzyme Q10 (COQ10) 200 MG CAPS, Take 1 capsule by mouth daily, Disp: , Rfl:     aspirin 325 MG tablet, Take 325 mg by mouth every other day , Disp: , Rfl:     levothyroxine (SYNTHROID) 75 MCG tablet, Take 1 tablet by mouth once daily, Disp: 90 tablet, Rfl: 3    esomeprazole (NEXIUM) 40 MG delayed release capsule, Take 40 mg by mouth every morning (before breakfast), Disp: , Rfl:     Cholecalciferol (VITAMIN D3) 125 MCG (5000 UT) TABS, Take 10,000 Units by mouth daily Indications: DOSE INCREASED PER ES ON 3/16/21, Disp: , Rfl:     TURMERIC PO, Take 2 tablets by mouth daily , Disp: , Rfl:     estradiol (ESTRACE) 0.1 MG/GM vaginal cream, INSERT 1 GRAM BY VAGINAL ROUTE DAILY FOR 14 DAYS THEN 3 TIMES A WEEK THEREAFTER, Disp: , Rfl:     CALCIUM-VITAMIN D PO, Take by mouth daily, Disp: , Rfl:     Multiple Vitamin (MULTIVITAMIN PO), Take  by mouth daily. , Disp: , Rfl:       PHYSICAL EXAMINATION / OBJECTIVE     Objective:  BP 98/68 (Site: Left Upper Arm, Position: Sitting, Cuff Size: Medium Adult)   Pulse 87   Ht 5' 5\" (1.651 m)   Wt 179 lb 3.2 oz (81.3 kg)   SpO2 99%   BMI 29.82 kg/m²     Physical Exam      General Appearance:  AAO x 3 ,  well-developed and well nourished  Head: NCAT  Eyes: No abnormalities. ,  Sclera non-icteric,   Ears / Nose: normal  appearance  ears and nose. No active drainage   Mouth:  MMM, ears without deformities  Neck: No jugular venous distention, appears symmetric, good ROM  Lymph:  no adenopathy   Pulmonary/Chest: CTA bilateral ,  symmetric chest expansion. Cardiovascular: Normal S1 and S2, NO murmur, rub, gallop  : Deferred   Abd/GI: Deferred   Neurologic: Speech normal, no facial droop,  Skin: NO rash on exposed skin. Musculoskeletal:  Upper extremities:    Hands:  tender and squaring - lft cmc     Lower extremities:  HIPS -- tender bilat   Knees:  - Non-tender. Ankle: tender left   Feet: Non-tender     Spine: tender lumbar and sacral spine, perispinal regiong.         LABS      Lab Results   Component Value Date    WBC 6.5 10/29/2020    HGB 11.4 (L) 10/29/2020    HGB 11.9 02/04/2020    HGB 13.3 04/18/2017    MCV 92.8 10/29/2020    MCHC 30.6 (L) 10/29/2020    RDW 13.3 04/18/2017     10/29/2020     04/18/2017    LYMPHSABS 2.3 10/29/2020    LYMPHSABS 1.9 04/18/2017    EOSABS 0.1 10/29/2020    BASOSABS 0.0 10/29/2020         Chemistry        Component Value Date/Time     04/26/2018 0953    K 4.7 04/26/2018 0953     04/26/2018 0953    CO2 26 04/26/2018 0953    BUN 16 10/29/2020 1605    CREATININE 0.7 10/29/2020 1605        Component Value Date/Time    CALCIUM 8.8 04/26/2018 0953    ALKPHOS 77 04/26/2018 0953    ALKPHOS 50 04/18/2017 1101    AST 17 04/26/2018 0953    ALT 20 04/26/2018 0953    BILITOT 0.5 04/26/2018 0953    BILITOT NEGATIVE 05/31/2016 1630            Lab Results   Component Value Date    SEDRATE 12 10/29/2020    SEDRATE 2 05/31/2016    SEDRATE 10 11/13/2012    CRP < 0.03 10/29/2020    CRP <0.20 05/31/2016       Lab Results   Component Value Date/Time    VITD25 25 05/07/2021 11:18 AM       No results found for: C3, C4]  Lab Results   Component Value Date    ANASCRN None Detected 05/21/2015     No results found for: C3, C4  No results found for: CCPAB, RF      RADIOLOGY / PROCEDURES: ASSESSMENT/PLAN:     1. Axial spondyloarthritis ([facility] [address]    2. Osteoarthritis of multiple joints, unspecified osteoarthritis type    3. SI joint arthritis    4. De Quervain's disease (radial styloid tenosynovitis)      Generalized joint pains in the fingers, wrist, elbows, shoulders, hips, knees, ankles, feet, neck and back. Most severe pain lower back. Symptoms started: Around 2015 with the development of arthralgias affecting the shoulders, elbows, fingers, hips, knees along with generalized myofascial tenderness of the upper and lower extremities. Occasional shooting pain from lower back to the anterior thigh. Timing: mornings and evenings after sitting down. Aggravating factors: weather changes. Inactivity. ,   Pressures along the upper arms and lower legs. Alleviating factors: Ibuprofen 800mg qd (moderate to a lot relief). (+) dry mouth/Dry eyes. - exam with wide spread tenderness, 14/18 FMS tender point. (+) lower back tenderness  Previously treated by  Dr. Carolyn Pickard 2016 for possible  Lupus like syndrome vs Sjogren syndrome based upon her polyarthralgia's/myalgia's that improved with the joint pains. She was started on plaquenil 200mg qd and then transitioned to bid dosing w/ ?  Relief. -   prior CT A/P 2017 and 2015 with evidence of SI joint and pubic symphysis changes. + cystica and ? erosive changes. (have asked radiology to review 12/21/2020)  , sx's with some inflammatory back symptoms, + gelling, relief with movement & NSAIDs, mild AM stiffness (15 minutes)        Polyarthralgia:    Previously treated by  Dr. Carolyn Pickard 2016 for possible  Lupus like syndrome vs Sjogren syndrome based upon her polyarthralgia's/myalgia's that improved with the joint pains. She was started on plaquenil 200mg qd and then transitioned to bid dosing w/ ?  Relief. -   prior CT A/P 2017 and 2015 with evidence of SI joint and pubic symphysis changes. + cystica and ? erosive changes.  (have asked radiology to review 12/21/2020)  , sx's with some inflammatory back symptoms, + gelling, relief with movement & NSAIDs, mild AM stiffness (15 minutes)  -- prior therapy Mobic, plaquenil,  Tylenol (no relief), Naproxen (no relief), prednisone taper (significant relief) . ----no active inflammatory back pain. Continued active lower back pain more consistent with degenerative type changes given the worsening of symptoms with activity and improvement with nonweightbearing. Continue close monitoring.      -- tumeric 2000mg daily    -- Concern for possible component of myofascial pain syndrome given the tenderness to touch at several soft tissue location. Chronic low back pain:? SpA  --Previous with inflammatory back pain symptoms radiographic abnormalities on CT abdomen pelvis from 2015 2017 and with notable SI joint subchondral sclerosis and pubic symphysis changes. MRI from 2021 with features concerning for sacroiliitis. Currently symptoms are more degenerative in nature as they are worsened with activity and improved with an activity. Continue close monitoring given concern for underlying spondyloarthropathy. -- Turmeric 2000 mg daily currently providing relief of symptoms. --    # osteoarthritis hand - tumeric    - discussed trial of diclofenac gel. # De Quervain's tenosynovitis.:  resolved      # Planter fasciitis: resolved.      #  Osteoporosis: - treated by Gynecologist     #  Medication monitoring -patient currently not any prescription drugs. Return in about 6 months (around 1/13/2023). New Prescriptions    No medications on file       7/13/2022    Electronically signed by Saul Byod DO on 7/13/22 at 3:09 PM EDT  Please contact the office if you have any questions or change of symptoms.

## 2022-07-15 ENCOUNTER — HOSPITAL ENCOUNTER (OUTPATIENT)
Dept: MRI IMAGING | Age: 62
Discharge: HOME | End: 2022-07-15

## 2022-07-15 DIAGNOSIS — Z00.6 ENCOUNTER FOR EXAMINATION FOR NORMAL COMPARISON OR CONTROL IN CLINICAL RESEARCH PROGRAM: ICD-10-CM

## 2022-07-26 ENCOUNTER — HOSPITAL ENCOUNTER (OUTPATIENT)
Dept: WOMENS IMAGING | Age: 62
Discharge: HOME | End: 2022-07-26
Payer: COMMERCIAL

## 2022-07-26 DIAGNOSIS — M85.80 OSTEOPENIA, UNSPECIFIED LOCATION: ICD-10-CM

## 2022-07-26 PROCEDURE — 77080 DXA BONE DENSITY AXIAL: CPT

## 2022-07-28 ENCOUNTER — TELEPHONE (OUTPATIENT)
Dept: FAMILY MEDICINE CLINIC | Age: 62
End: 2022-07-28

## 2022-07-28 NOTE — TELEPHONE ENCOUNTER
----- Message from Kathleen Villa MD sent at 7/28/2022  3:32 PM EDT -----  Notify pt-   Recent DEXA reviewed. DEXA shows Osteopenia with (-) FRAX- Continue Calcium with Vitamin D supplementation and weight bearing exercise. Will recheck in 2 years.      ES

## 2022-08-10 ENCOUNTER — NURSE ONLY (OUTPATIENT)
Dept: LAB | Age: 62
End: 2022-08-10

## 2022-08-10 DIAGNOSIS — Z00.00 LABORATORY EXAM ORDERED AS PART OF ROUTINE GENERAL MEDICAL EXAMINATION: ICD-10-CM

## 2022-08-10 DIAGNOSIS — E55.9 VITAMIN D DEFICIENCY: ICD-10-CM

## 2022-08-10 DIAGNOSIS — Z13.220 LIPID SCREENING: ICD-10-CM

## 2022-08-10 DIAGNOSIS — E03.9 HYPOTHYROIDISM, UNSPECIFIED TYPE: ICD-10-CM

## 2022-08-10 LAB
CHOLESTEROL, TOTAL: 151 MG/DL (ref 100–199)
HDLC SERPL-MCNC: 78 MG/DL
LDL CHOLESTEROL CALCULATED: 59 MG/DL
T4 FREE: 1.57 NG/DL (ref 0.93–1.76)
TRIGL SERPL-MCNC: 71 MG/DL (ref 0–199)
TSH SERPL DL<=0.05 MIU/L-ACNC: 4.79 UIU/ML (ref 0.4–4.2)
VITAMIN D 25-HYDROXY: 21 NG/ML (ref 30–100)

## 2022-08-12 ENCOUNTER — TELEPHONE (OUTPATIENT)
Dept: FAMILY MEDICINE CLINIC | Age: 62
End: 2022-08-12

## 2022-08-12 DIAGNOSIS — E03.9 HYPOTHYROIDISM, UNSPECIFIED TYPE: ICD-10-CM

## 2022-08-12 DIAGNOSIS — E55.9 VITAMIN D DEFICIENCY: Primary | ICD-10-CM

## 2022-08-12 RX ORDER — LEVOTHYROXINE SODIUM 0.07 MG/1
75 TABLET ORAL DAILY
Qty: 90 TABLET | Refills: 3 | Status: SHIPPED | OUTPATIENT
Start: 2022-08-12

## 2022-08-12 NOTE — TELEPHONE ENCOUNTER
Pt requesting refill synthroid. [facility] [address] verified.      Patient's last appointment was : 7/8/2022  Patient's next appointment is :   Future Appointments   Date Time Provider Melecio Morrison   1/16/2023  3:00 PM DO SUE Pelaez [facility] [place]   7/20/2023  8:30 AM Elias Valencia MD [facility] [place]     Last refilled:    Lab Results   Component Value Date    LABA1C 5.2 02/25/2016     Lab Results   Component Value Date    CHOL 151 08/10/2022    TRIG 71 08/10/2022    HDL 78 08/10/2022    LDLCALC 59 08/10/2022     Lab Results   Component Value Date     04/26/2018    K 4.7 04/26/2018     04/26/2018    CO2 26 04/26/2018    BUN 16 10/29/2020    CREATININE 0.7 10/29/2020    GLUCOSE 80 04/26/2018    CALCIUM 8.8 04/26/2018    PROT 6.6 04/26/2018    LABALBU 4.3 04/26/2018    BILITOT 0.5 04/26/2018    ALKPHOS 77 04/26/2018    AST 17 04/26/2018    ALT 20 04/26/2018    LABGLOM 86 (A) 10/29/2020     Lab Results   Component Value Date    TSH 4.790 (H) 08/10/2022    T4FREE 1.57 08/10/2022     Lab Results   Component Value Date    WBC 6.5 10/29/2020    HGB 11.4 (L) 10/29/2020    HCT 37.3 10/29/2020    MCV 92.8 10/29/2020     10/29/2020

## 2022-08-12 NOTE — TELEPHONE ENCOUNTER
Spoke with pt informed pt of Vitamins D3 drops (liquid 15,000 u daily) and repeat labs. Pt verbalized understanding. Lab order mailed to pt.

## 2022-08-12 NOTE — TELEPHONE ENCOUNTER
Discussion noted. Given previous bariatric surgery, patient may have issues with absorption of her current vitamin D formulation. Would encourage patient to  vitamin D3 drops over-the-counter (may have to get through [facility] if unavailable locally)-15,000 units daily. Please have patient take this dose consistently, as it becomes more difficult to determine appropriate dose in future if takingvariable dosages  Recheck free T4/TSH, vitamin D 25 OH, magnesium, calcium, and ionized calciumin 6-8 weeks to assure improvement. MIGUEL Rodrigues LPN   2/12/7561  0:23 PM EDT         Spoke with pt, informed pt of lab results per Dr Jose Clemons. Pt states she iscurrently taking Vitamin D3 15,000 - 20,000 daily. Sadie Ribeiro MD   8/10/2022  3:49 PM EDT         Notify pt-   Results reviewed. Vitamin D 25 OH low at 21-please confirm current dose of vitamin D3 thatpatient is taking on daily basis? TSH slightly elevated at 4.79/free T4 normal-continue current dose of Synthroid  FLP okay  Patient will likely need recheck free T4/TSH, as well as vitamin D 25 OH after determining what dose of vitamin D she is currently taking and what dose weshould change to. Please let me know.   MIGUEL

## 2022-10-07 ENCOUNTER — TELEPHONE (OUTPATIENT)
Dept: FAMILY MEDICINE CLINIC | Age: 62
End: 2022-10-07

## 2022-10-07 DIAGNOSIS — U07.1 COVID-19: Primary | ICD-10-CM

## 2022-10-07 NOTE — TELEPHONE ENCOUNTER
Received an after hours call from Roberth. She recent tested positive for COVID 19 today at the [address] [place] [address] [facility]gent [facility]. She would like to be started on paxlovid. She has had paxlovid in the past for COVID 19. Symptoms include fatigue, cough, and congestion. Pulse Ox at home normal at 97%. Plan  - Will start paxlovid per patient request. Discussed risks and benefits. Patient can also get COVID booster once symptoms completely resolved.    - Video visit on 10/10 to see how she is doing  - Drink plenty of fluids, small frequent meals, and rest  -Tylenol and motrin as needed for fevers and aches   - ED going instructions provided

## 2022-10-10 NOTE — TELEPHONE ENCOUNTER
Spoke to pt and she states she feels much better and does not feel she needs an appointment at this time.

## 2022-12-08 ENCOUNTER — TELEPHONE (OUTPATIENT)
Dept: FAMILY MEDICINE CLINIC | Age: 62
End: 2022-12-08

## 2022-12-08 NOTE — TELEPHONE ENCOUNTER
Pt stopped in with a form that needs filled out. It is for an Influenza Vaccination Exempt. Pt will come  the exemption when completed.

## 2023-03-22 ENCOUNTER — TELEPHONE (OUTPATIENT)
Dept: FAMILY MEDICINE CLINIC | Age: 63
End: 2023-03-22

## 2023-03-22 DIAGNOSIS — E55.9 VITAMIN D DEFICIENCY: Primary | ICD-10-CM

## 2023-03-22 DIAGNOSIS — E03.9 HYPOTHYROIDISM, UNSPECIFIED TYPE: ICD-10-CM

## 2023-03-22 DIAGNOSIS — R30.0 DYSURIA: ICD-10-CM

## 2023-03-22 NOTE — TELEPHONE ENCOUNTER
Notify patient-  Results reviewed  LFTs normal  BUN/creatinine normal  TSH mildly elevated/total T4 mildly elevated/total T3 normal-was free T4 done as previously ordered (this value is needed when levothyroxine supplementation is used)? Ferritin normal  Vitamin D slightly low at 25-what dose of vitamin D is patient taking daily at this time? Let me know.   ES

## 2023-03-28 NOTE — TELEPHONE ENCOUNTER
Check with patient to see if she is taking 20,000 IU daily or weekly and in what form (tablet versus liquid form)?   ES

## 2023-03-28 NOTE — TELEPHONE ENCOUNTER
This nurse notified patient of lab results. She states presently she is taking 20,000 IU vitamin D3. She also wants to know when to have her thyroid labs rechecked.

## 2023-03-28 NOTE — TELEPHONE ENCOUNTER
Discussion noted. Would recommend patient take 10,000 units daily in liquid form OTC (as I suspect she is having difficulty with absorption due to prior gastric bypass). I know this is lower than the dose she is currently taking, however I hope that liquid form will be absorbed more readily. Would recommend recheck vitamin D 25 OH and free T4/TSH in 6 weeks. Orders placed.   ES

## 2023-03-30 NOTE — TELEPHONE ENCOUNTER
PATIENT NOTIFIED AND VERBALIZED UNDERSTANDING  Patient is asking for UA and reflex culture sent to lab to be done now  for UTI symptoms.  Please advise

## 2023-04-05 ENCOUNTER — HOSPITAL ENCOUNTER (OUTPATIENT)
Dept: WOMENS IMAGING | Age: 63
Discharge: HOME | End: 2023-04-05
Payer: COMMERCIAL

## 2023-04-05 DIAGNOSIS — Z12.31 VISIT FOR SCREENING MAMMOGRAM: ICD-10-CM

## 2023-04-05 PROCEDURE — 77063 BREAST TOMOSYNTHESIS BI: CPT

## 2023-04-19 ENCOUNTER — OFFICE VISIT (OUTPATIENT)
Dept: RHEUMATOLOGY | Age: 63
End: 2023-04-19
Payer: COMMERCIAL

## 2023-04-19 VITALS
DIASTOLIC BLOOD PRESSURE: 76 MMHG | HEART RATE: 71 BPM | OXYGEN SATURATION: 100 % | BODY MASS INDEX: 29.49 KG/M2 | WEIGHT: 177 LBS | HEIGHT: 65 IN | SYSTOLIC BLOOD PRESSURE: 106 MMHG

## 2023-04-19 DIAGNOSIS — M65.4 DE QUERVAIN'S DISEASE (RADIAL STYLOID TENOSYNOVITIS): ICD-10-CM

## 2023-04-19 DIAGNOSIS — M46.90 AXIAL SPONDYLOARTHRITIS (HCC): Primary | ICD-10-CM

## 2023-04-19 DIAGNOSIS — M47.818 SI JOINT ARTHRITIS: ICD-10-CM

## 2023-04-19 DIAGNOSIS — M15.9 OSTEOARTHRITIS OF MULTIPLE JOINTS, UNSPECIFIED OSTEOARTHRITIS TYPE: ICD-10-CM

## 2023-04-19 PROCEDURE — 1036F TOBACCO NON-USER: CPT | Performed by: INTERNAL MEDICINE

## 2023-04-19 PROCEDURE — G8427 DOCREV CUR MEDS BY ELIG CLIN: HCPCS | Performed by: INTERNAL MEDICINE

## 2023-04-19 PROCEDURE — 3017F COLORECTAL CA SCREEN DOC REV: CPT | Performed by: INTERNAL MEDICINE

## 2023-04-19 PROCEDURE — G8417 CALC BMI ABV UP PARAM F/U: HCPCS | Performed by: INTERNAL MEDICINE

## 2023-04-19 PROCEDURE — 99214 OFFICE O/P EST MOD 30 MIN: CPT | Performed by: INTERNAL MEDICINE

## 2023-04-19 RX ORDER — SULFASALAZINE 500 MG/1
TABLET ORAL
Qty: 98 TABLET | Refills: 0 | Status: SHIPPED | OUTPATIENT
Start: 2023-04-19 | End: 2023-05-17

## 2023-04-19 ASSESSMENT — ENCOUNTER SYMPTOMS
CONSTIPATION: 0
COUGH: 0
GASTROINTESTINAL NEGATIVE: 1
ABDOMINAL PAIN: 0
WHEEZING: 0
EYE REDNESS: 0
EYES NEGATIVE: 1
RESPIRATORY NEGATIVE: 1
COLOR CHANGE: 0
VOMITING: 0
NAUSEA: 0
SHORTNESS OF BREATH: 0
EYE PAIN: 0

## 2023-04-19 NOTE — PATIENT INSTRUCTIONS
15-year period had a 46 percent lower risk of dying from an inflammatory disease like RA compared with those who ate the fewest nuts. Olive oil - Olive oil, particularly extra virgin olive oil, has been shown to lower the amount of chemicals in the body that cause inflammation, in much the same way that ibuprofen does. Drizzle it over salads or use as a low-fat alternative to other cooking oils and butter. Extra virgin olive oil is best because it is less processed compared to other types, and thus has more nutrients. Oily fish and fish oil (from cold water fish such as salmon, herring, cod, trout and sardines)  Why Fish Oil? Fish oil is an excellent source of omega-3 fatty acids, which the body converts to powerful anti-inflammatory chemicals. Fish oil has been studied extensively in RA and other inflammatory conditions. Studies have shown that fish oil can relieve joint tenderness and morning stiffness, reduce the duration of morning stiffness and reduce disease activity in people with RA. For some people, it has allowed them to lower the amount of conventional medicine they take for their RA. Early studies show that fish oil may have similar effects in people with osteoarthritis. Some evidence suggests that the positive effects of fish oil supplements are enhanced when fish oil is consumed in combination with olive oil. Since it is difficult to get enough fish oil from food alone, people with RA should consider fish oil capsules with at least 30 percent EPA/DHA. Its important to talk to your doctor about the right dosage of fish oil for you if you choose to start taking it. Higher doses of fish oil may interact with certain drugs, including those for high blood pressure.

## 2023-05-02 DIAGNOSIS — R39.9 URINARY TRACT INFECTION SYMPTOMS: Primary | ICD-10-CM

## 2023-05-03 DIAGNOSIS — R39.9 URINARY TRACT INFECTION SYMPTOMS: Primary | ICD-10-CM

## 2023-06-14 ENCOUNTER — TELEPHONE (OUTPATIENT)
Dept: RHEUMATOLOGY | Age: 63
End: 2023-06-14

## 2023-07-17 DIAGNOSIS — E03.9 HYPOTHYROIDISM, UNSPECIFIED TYPE: ICD-10-CM

## 2023-07-18 RX ORDER — LEVOTHYROXINE SODIUM 0.07 MG/1
75 TABLET ORAL DAILY
Qty: 90 TABLET | Refills: 3 | Status: SHIPPED | OUTPATIENT
Start: 2023-07-18

## 2023-07-18 NOTE — TELEPHONE ENCOUNTER
Patient's last appointment was : 7-8-22  Patient's next appointment is :  7-20-23  Last refilled: 8-12-22  Pharmacy Verified    Lab Results   Component Value Date    LABA1C 5.2 02/25/2016     Lab Results   Component Value Date    CHOL 151 08/10/2022    TRIG 71 08/10/2022    HDL 78 08/10/2022    LDLCALC 59 08/10/2022     Lab Results   Component Value Date     06/13/2023    K 4.1 06/13/2023     06/13/2023    CO2 24 06/13/2023    BUN 15 06/13/2023    CREATININE 0.7 06/13/2023    GLUCOSE 92 06/13/2023    CALCIUM 8.9 06/13/2023    PROT 6.7 06/13/2023    LABALBU 4.2 06/13/2023    BILITOT 0.7 06/13/2023    ALKPHOS 73 06/13/2023    AST 19 06/13/2023    ALT 16 06/13/2023    LABGLOM >60 06/13/2023     Lab Results   Component Value Date    TSH 3.960 06/13/2023    T4FREE 1.53 06/13/2023     Lab Results   Component Value Date    WBC 7.2 06/13/2023    HGB 12.3 06/13/2023    HCT 40.2 06/13/2023    MCV 94.1 06/13/2023     06/13/2023

## 2023-07-20 ENCOUNTER — OFFICE VISIT (OUTPATIENT)
Dept: FAMILY MEDICINE CLINIC | Age: 63
End: 2023-07-20
Payer: COMMERCIAL

## 2023-07-20 VITALS
DIASTOLIC BLOOD PRESSURE: 62 MMHG | HEIGHT: 65 IN | RESPIRATION RATE: 12 BRPM | OXYGEN SATURATION: 99 % | SYSTOLIC BLOOD PRESSURE: 102 MMHG | HEART RATE: 83 BPM | TEMPERATURE: 98.4 F | BODY MASS INDEX: 29.92 KG/M2 | WEIGHT: 179.6 LBS

## 2023-07-20 DIAGNOSIS — M85.80 OSTEOPENIA, UNSPECIFIED LOCATION: ICD-10-CM

## 2023-07-20 DIAGNOSIS — Z13.220 LIPID SCREENING: ICD-10-CM

## 2023-07-20 DIAGNOSIS — M54.31 BILATERAL SCIATICA: ICD-10-CM

## 2023-07-20 DIAGNOSIS — M53.3 SACROILIAC JOINT DYSFUNCTION OF BOTH SIDES: ICD-10-CM

## 2023-07-20 DIAGNOSIS — D50.9 IRON DEFICIENCY ANEMIA, UNSPECIFIED IRON DEFICIENCY ANEMIA TYPE: ICD-10-CM

## 2023-07-20 DIAGNOSIS — Z00.00 LABORATORY EXAM ORDERED AS PART OF ROUTINE GENERAL MEDICAL EXAMINATION: ICD-10-CM

## 2023-07-20 DIAGNOSIS — K21.9 GASTROESOPHAGEAL REFLUX DISEASE, UNSPECIFIED WHETHER ESOPHAGITIS PRESENT: ICD-10-CM

## 2023-07-20 DIAGNOSIS — R30.0 DYSURIA: ICD-10-CM

## 2023-07-20 DIAGNOSIS — Z00.00 WELL ADULT EXAM: Primary | ICD-10-CM

## 2023-07-20 DIAGNOSIS — E03.9 HYPOTHYROIDISM, UNSPECIFIED TYPE: ICD-10-CM

## 2023-07-20 DIAGNOSIS — E55.9 VITAMIN D DEFICIENCY: ICD-10-CM

## 2023-07-20 DIAGNOSIS — M54.32 BILATERAL SCIATICA: ICD-10-CM

## 2023-07-20 PROCEDURE — 99396 PREV VISIT EST AGE 40-64: CPT | Performed by: FAMILY MEDICINE

## 2023-07-20 RX ORDER — ASPIRIN 81 MG/1
81 TABLET ORAL EVERY OTHER DAY
COMMUNITY

## 2023-07-20 SDOH — ECONOMIC STABILITY: HOUSING INSECURITY
IN THE LAST 12 MONTHS, WAS THERE A TIME WHEN YOU DID NOT HAVE A STEADY PLACE TO SLEEP OR SLEPT IN A SHELTER (INCLUDING NOW)?: NO

## 2023-07-20 SDOH — ECONOMIC STABILITY: FOOD INSECURITY: WITHIN THE PAST 12 MONTHS, YOU WORRIED THAT YOUR FOOD WOULD RUN OUT BEFORE YOU GOT MONEY TO BUY MORE.: NEVER TRUE

## 2023-07-20 SDOH — ECONOMIC STABILITY: INCOME INSECURITY: HOW HARD IS IT FOR YOU TO PAY FOR THE VERY BASICS LIKE FOOD, HOUSING, MEDICAL CARE, AND HEATING?: NOT HARD AT ALL

## 2023-07-20 SDOH — ECONOMIC STABILITY: FOOD INSECURITY: WITHIN THE PAST 12 MONTHS, THE FOOD YOU BOUGHT JUST DIDN'T LAST AND YOU DIDN'T HAVE MONEY TO GET MORE.: NEVER TRUE

## 2023-07-20 ASSESSMENT — PATIENT HEALTH QUESTIONNAIRE - PHQ9
SUM OF ALL RESPONSES TO PHQ9 QUESTIONS 1 & 2: 0
SUM OF ALL RESPONSES TO PHQ QUESTIONS 1-9: 0
2. FEELING DOWN, DEPRESSED OR HOPELESS: 0
1. LITTLE INTEREST OR PLEASURE IN DOING THINGS: 0
SUM OF ALL RESPONSES TO PHQ QUESTIONS 1-9: 0

## 2023-07-20 ASSESSMENT — ENCOUNTER SYMPTOMS
ANAL BLEEDING: 0
CHEST TIGHTNESS: 0
DIARRHEA: 0
BLOOD IN STOOL: 0
CONSTIPATION: 0
ABDOMINAL PAIN: 0
VOMITING: 0
SHORTNESS OF BREATH: 1
NAUSEA: 1

## 2023-07-20 NOTE — PATIENT INSTRUCTIONS
Encouraged increased water intake, drinking > 80 ounces daily. Pt would like to hold on FLP at this time  Check UA with reflex C&S. Follow-up with Dr. Lori Pereira as planned 10/19/2023. (updated 7/20/2023)  Follow-up with Dr. Brandy Alvarez as planned (updated 7/20/2023)  Follow-up with Endocrinology (Dr. Debi Tam) as needed (updated 7/20/2023)  Refer to Physical Therapy at this time for bilateral sciatica and bilateral SI joint dysfunction. Continue current medications  No refills needed at this time  Follow up in 12 months. Preventive Health Topics:  Encouraged Bivalent COVID VACCINE booster- pt declines. (updated 7/20/2023)  Encouraged FLU VACCINE after October 1st annually- pt declines. (updated 7/20/2023)  PAP/ PELVIC management per Dr. Dio Gaitan-  last appt ~ 4/2023- to follow up annually (updated 7/20/2023)  MAMMO due after 4/6/2024  COLONOSCOPY done 12/2/2019 per Dr. Rene Godinez- to do again in 2029.  (updated 7/20/2023)  EGD done 12/2/2019 per Dr. Rene Godinez- GERD, Inactive Gastritis- to follow up PRN. Anemia most likely due to previous Haris-En- Y Bypass.   (updated 7/20/2023)  DEXA done 7/28/2022 - Osteopenia with negative FRAX- to continue Calcium with Vitamin D supplementation and weight bearing exercise-to do again after 7/28/2024. (updated 7/20/2023)

## 2023-07-20 NOTE — PROGRESS NOTES
Health Maintenance Due   Topic Date Due    COVID-19 Vaccine (4 - Booster for Pfizer series) 02/16/2022    Depression Screen  07/08/2023

## 2023-10-19 ENCOUNTER — OFFICE VISIT (OUTPATIENT)
Dept: RHEUMATOLOGY | Age: 63
End: 2023-10-19
Payer: COMMERCIAL

## 2023-10-19 VITALS
WEIGHT: 176.4 LBS | DIASTOLIC BLOOD PRESSURE: 64 MMHG | SYSTOLIC BLOOD PRESSURE: 106 MMHG | HEART RATE: 88 BPM | OXYGEN SATURATION: 97 % | BODY MASS INDEX: 29.39 KG/M2 | HEIGHT: 65 IN

## 2023-10-19 DIAGNOSIS — M15.9 OSTEOARTHRITIS OF MULTIPLE JOINTS, UNSPECIFIED OSTEOARTHRITIS TYPE: ICD-10-CM

## 2023-10-19 DIAGNOSIS — R35.0 URINARY FREQUENCY: ICD-10-CM

## 2023-10-19 DIAGNOSIS — M46.90 AXIAL SPONDYLOARTHRITIS (HCC): Primary | ICD-10-CM

## 2023-10-19 DIAGNOSIS — M65.4 DE QUERVAIN'S DISEASE (RADIAL STYLOID TENOSYNOVITIS): ICD-10-CM

## 2023-10-19 PROCEDURE — 99214 OFFICE O/P EST MOD 30 MIN: CPT | Performed by: INTERNAL MEDICINE

## 2023-10-19 PROCEDURE — 3017F COLORECTAL CA SCREEN DOC REV: CPT | Performed by: INTERNAL MEDICINE

## 2023-10-19 PROCEDURE — G8427 DOCREV CUR MEDS BY ELIG CLIN: HCPCS | Performed by: INTERNAL MEDICINE

## 2023-10-19 PROCEDURE — G8484 FLU IMMUNIZE NO ADMIN: HCPCS | Performed by: INTERNAL MEDICINE

## 2023-10-19 PROCEDURE — 1036F TOBACCO NON-USER: CPT | Performed by: INTERNAL MEDICINE

## 2023-10-19 PROCEDURE — G8417 CALC BMI ABV UP PARAM F/U: HCPCS | Performed by: INTERNAL MEDICINE

## 2023-10-19 ASSESSMENT — ENCOUNTER SYMPTOMS
EYE REDNESS: 0
CONSTIPATION: 0
GASTROINTESTINAL NEGATIVE: 1
VOMITING: 0
COLOR CHANGE: 0
NAUSEA: 0
WHEEZING: 0
RESPIRATORY NEGATIVE: 1
EYE PAIN: 0
ABDOMINAL PAIN: 0
COUGH: 0
SHORTNESS OF BREATH: 0
EYES NEGATIVE: 1

## 2023-10-19 NOTE — PATIENT INSTRUCTIONS
Axial spondyloarthritis (axSpA) is a form of arthritis that mostly causes pain and swelling in the spine and the joints that connect the bottom of the spine to the pelvis (sacroiliac joint). Other joints can be affected as well. It is a systemic disease, which means it may affect other body parts and organs. AxSpA tends to run in families. There are two types of axSpA:  Ankylosing spondylitis, or AS, also known as radiographic axSpA, because the damage it can cause to the sacroiliac joints and spine can be seen on X-rays. Nonradiographic axSpA (nr-axSpA) causes damage that may not be visible in X-rays but it may show up on magnetic resonance images (MRIs). The disease can occur at any age, but typically begins between ages 21 and 36. AS is more common in men than in women. However, nr-axSpA may be just as common in women as in men. It is less common among  Americans than people of other racial backgrounds.

## 2023-10-19 NOTE — PROGRESS NOTES
[facility] RHEUMATOLOGY FOLLOW UP NOTE       Date Of Service: 10/19/2023  Provider: Ruben Reynoso DO ,   PCP: Marii Kim MD   Name: Roddy Ledesma   MRN: [id number]        Subjective   CC: Follow-up (4 month follow up)        Roddy Ledesma  is a(n)63 y.o. female with a hx of *female with a hx of Iron def anemia, hypothyroidism, Vitamin d def, osteoporosis (t-score -2.6 in 8/2014)   here for follow-up evaluation of. Undifferentiated axial spondyloarthropathy, history of fibromyalgia, osteoporosis, deq tenosynovitis, plantar fasciitis. -- Recent travel to [place] for 2 months - returned 2 days ago. -- Celebrex 200mg daily helping with joint pains. Ongoing lower back pain - timing end of day, Aggravating -- weather changes. m ,Back - prolonged standing, sitting. Alleviating: non-wt bearing. , tumeric 2000 mg daily , salonpause patch. , stretching. Occasional shooting pain in the right thigh. Pcp recently referred to physical therapy. Am stiffness lasting around 10 minutes. + gelling. Denies am stiffness, weakness, incontinence    Osteoporosis -On vitamin D supplementation for low vit d from pcp. Repeat dexa shoes more osteopenia       REVIEW OF SYSTEMS: (ROS)    Review of Systems   Constitutional: Negative. Negative for fatigue, fever and unexpected weight change. HENT: Negative. Negative for congestion and mouth sores. Eyes: Negative. Negative for pain and redness. Respiratory: Negative. Negative for cough, shortness of breath and wheezing. Cardiovascular: Negative. Negative for chest pain and leg swelling. Gastrointestinal: Negative. Negative for abdominal pain, constipation, nausea and vomiting. Endocrine: Negative for polyuria. Genitourinary: Negative. Negative for difficulty urinating, frequency and hematuria. Skin: Negative. Negative for color change and rash. Neurological: Negative. Negative for dizziness, weakness, numbness and headaches.

## 2024-01-29 ENCOUNTER — NURSE ONLY (OUTPATIENT)
Dept: LAB | Age: 64
End: 2024-01-29

## 2024-02-04 LAB — CYTOLOGY THIN PREP PAP: NORMAL

## 2024-02-06 ENCOUNTER — TELEPHONE (OUTPATIENT)
Dept: RHEUMATOLOGY | Age: 64
End: 2024-02-06

## 2024-02-06 DIAGNOSIS — M46.90 AXIAL SPONDYLOARTHRITIS (HCC): ICD-10-CM

## 2024-02-06 DIAGNOSIS — M47.818 SI JOINT ARTHRITIS: ICD-10-CM

## 2024-02-06 RX ORDER — CELECOXIB 200 MG/1
200 CAPSULE ORAL DAILY
Qty: 30 CAPSULE | Refills: 3 | Status: SHIPPED | OUTPATIENT
Start: 2024-02-06

## 2024-02-06 NOTE — TELEPHONE ENCOUNTER
Pt is calling and needs a refill of Seletoxid 200mg sent to [facility] on [address].  Please advise pt at [phone]

## 2024-04-04 ENCOUNTER — HOSPITAL ENCOUNTER (OUTPATIENT)
Dept: WOMENS IMAGING | Age: 64
Discharge: HOME | End: 2024-04-04

## 2024-04-04 DIAGNOSIS — Z12.31 VISIT FOR SCREENING MAMMOGRAM: ICD-10-CM

## 2024-04-29 ENCOUNTER — HOSPITAL ENCOUNTER (OUTPATIENT)
Dept: WOMENS IMAGING | Age: 64
Discharge: HOME | End: 2024-04-29
Payer: COMMERCIAL

## 2024-04-29 VITALS — BODY MASS INDEX: 29.99 KG/M2 | HEIGHT: 65 IN | WEIGHT: 180 LBS

## 2024-04-29 DIAGNOSIS — Z12.31 VISIT FOR SCREENING MAMMOGRAM: ICD-10-CM

## 2024-04-29 PROCEDURE — 77067 SCR MAMMO BI INCL CAD: CPT

## 2024-05-20 ENCOUNTER — OFFICE VISIT (OUTPATIENT)
Dept: RHEUMATOLOGY | Age: 64
End: 2024-05-20
Payer: COMMERCIAL

## 2024-05-20 VITALS
HEIGHT: 65 IN | HEART RATE: 85 BPM | WEIGHT: 185 LBS | DIASTOLIC BLOOD PRESSURE: 72 MMHG | SYSTOLIC BLOOD PRESSURE: 118 MMHG | BODY MASS INDEX: 30.82 KG/M2 | OXYGEN SATURATION: 96 %

## 2024-05-20 DIAGNOSIS — M47.818 SI JOINT ARTHRITIS: ICD-10-CM

## 2024-05-20 DIAGNOSIS — M46.90 AXIAL SPONDYLOARTHRITIS (HCC): Primary | ICD-10-CM

## 2024-05-20 DIAGNOSIS — M15.9 OSTEOARTHRITIS OF MULTIPLE JOINTS, UNSPECIFIED OSTEOARTHRITIS TYPE: ICD-10-CM

## 2024-05-20 DIAGNOSIS — M65.4 DE QUERVAIN'S DISEASE (RADIAL STYLOID TENOSYNOVITIS): ICD-10-CM

## 2024-05-20 PROCEDURE — G8427 DOCREV CUR MEDS BY ELIG CLIN: HCPCS | Performed by: INTERNAL MEDICINE

## 2024-05-20 PROCEDURE — G8417 CALC BMI ABV UP PARAM F/U: HCPCS | Performed by: INTERNAL MEDICINE

## 2024-05-20 PROCEDURE — 3017F COLORECTAL CA SCREEN DOC REV: CPT | Performed by: INTERNAL MEDICINE

## 2024-05-20 PROCEDURE — 99214 OFFICE O/P EST MOD 30 MIN: CPT | Performed by: INTERNAL MEDICINE

## 2024-05-20 PROCEDURE — 1036F TOBACCO NON-USER: CPT | Performed by: INTERNAL MEDICINE

## 2024-05-20 RX ORDER — CELECOXIB 200 MG/1
200 CAPSULE ORAL DAILY
Qty: 90 CAPSULE | Refills: 0 | Status: SHIPPED | OUTPATIENT
Start: 2024-05-20

## 2024-05-20 ASSESSMENT — ENCOUNTER SYMPTOMS
EYES NEGATIVE: 1
NAUSEA: 0
RESPIRATORY NEGATIVE: 1
EYE PAIN: 0
SHORTNESS OF BREATH: 0
EYE REDNESS: 0
GASTROINTESTINAL NEGATIVE: 1
WHEEZING: 0
CONSTIPATION: 0
ABDOMINAL PAIN: 0
COLOR CHANGE: 0
VOMITING: 0
COUGH: 0

## 2024-05-20 NOTE — PROGRESS NOTES
[facility] RHEUMATOLOGY FOLLOW UP NOTE       Date Of Service: 5/20/2024  Provider: WAYNE NEWMAN DO ,   PCP: Xu Jean MD   Name: Priyanka Maher   MRN: [id number]        Subjective   CC: Follow-up (6 month f/u axial spondyloaathritis/She had a fall back in April and tore her right rotator cuff. She is scheduled to start therapy this week. /She is having back pain. Her pain is a 2./She is having pain in left rib area with some movements and deep breaths since yesterday. She feels Ibuprofen is helping. /In January she noticed pain in her left heal. She was seen by podiatry and told to wear shoes with arch support. She had xrays. Patient felt this did not help. )        Priyanka Maher  is a(n)63 y.o. female with a hx of *female with a hx of Iron def anemia, hypothyroidism, Vitamin d def, osteoporosis (t-score -2.6 in 8/2014)   here for follow-up evaluation of.  Undifferentiated axial spondyloarthropathy, history of fibromyalgia, osteoporosis, deq tenosynovitis, plantar fasciitis.     Bilateral foot pain evaluated by Dr. Ayala (podiatry) instructions to wear arch supports and x-rays were completed. - no relief of the foot pain with the arch support. - relief of foot pain with new house slippers/scandals.     Reported fall with tear of the right rotator cuff April 2024.  Scheduled to start physical therapy in the near future if no improvement possible pursuit of surgical intervention.    Continued medial knee pain , bilateral shoulder, lower back. Pain up to 2/10 over the past week. Aggravating -- weather changes.Back - prolonged standing, sitting. Alleviating: non-wt bearing. , tumeric 2000 mg daily , salonpause patch. , stretching, Celebrex 200mg     Denies morning stiffness or joint swelling           REVIEW OF SYSTEMS: (ROS)    Review of Systems   Constitutional: Negative.  Negative for fatigue, fever and unexpected weight change.   HENT: Negative.  Negative for congestion and mouth sores.

## 2024-05-23 ENCOUNTER — PATIENT MESSAGE (OUTPATIENT)
Dept: FAMILY MEDICINE CLINIC | Age: 64
End: 2024-05-23

## 2024-06-07 NOTE — TELEPHONE ENCOUNTER
Received notice that pt did not see [employer] message - pls notify of this info:    Thanks!      \"      Hi Mrs. Rubio,  Sorry to hear you are not feeling well.  Given that you had a traumatic experience (recent fall), it is possible that you fractured your rib.  More likely, you injured cartilage that attaches the ribs to each other, or the ribs to the sternum.  It can be very painful (often sharp pain) and takes weeks (up to 6 weeks usually) to heal.  Typically, we recommend anti-inflammatories, ice/heat, and rest.  We could certainly consider an x-ray if you desire, however, for the majority of rib fractures, the treatment is the same.  We do not use rib binders anymore, as the risk of pneumonia increased significantly with this.  Please let me know your thoughts,  Xu   \"

## 2024-07-22 DIAGNOSIS — E03.9 HYPOTHYROIDISM, UNSPECIFIED TYPE: ICD-10-CM

## 2024-07-22 NOTE — TELEPHONE ENCOUNTER
Is patient still seeing Dr. Chacon in [place]?  Most recent TSH was significantly elevated.  Please check with patient and let me know.  Thanks.  ES

## 2024-07-22 NOTE — TELEPHONE ENCOUNTER
Patient's last appointment was : 7/20/2023 with our   Patient's next appointment is : Visit date not found   Last refilled on: 4/24/2024  Which pharmacy does the script need sent to: [facility], [address]      Lab Results   Component Value Date    LABA1C 5.2 02/25/2016     Lab Results   Component Value Date    CHOL 151 08/10/2022    TRIG 71 08/10/2022    HDL 78 08/10/2022     Lab Results   Component Value Date     06/13/2023    K 4.1 06/13/2023     06/13/2023    CO2 24 06/13/2023    BUN 15 06/13/2023    CREATININE 0.7 06/13/2023    GLUCOSE 92 06/13/2023    CALCIUM 8.9 06/13/2023    BILITOT 0.7 06/13/2023    ALKPHOS 73 06/13/2023    AST 19 06/13/2023    ALT 16 06/13/2023    LABGLOM >60 06/13/2023     Lab Results   Component Value Date    TSH 15.700 04/29/2024    K3RPEJE 2.3 04/29/2024    T4FREE 1.27 04/29/2024     Lab Results   Component Value Date    WBC 7.2 06/13/2023    HGB 12.3 06/13/2023    HCT 40.2 06/13/2023    MCV 94.1 06/13/2023     06/13/2023

## 2024-07-25 RX ORDER — LEVOTHYROXINE SODIUM 0.07 MG/1
75 TABLET ORAL DAILY
Qty: 90 TABLET | Refills: 0 | Status: SHIPPED | OUTPATIENT
Start: 2024-07-25

## 2024-07-25 NOTE — TELEPHONE ENCOUNTER
This nurse spoke with patient and she stated she no longer sees Dr Chacon and has not seen him in almost a year.

## 2024-07-25 NOTE — TELEPHONE ENCOUNTER
Discussion noted.  Okay for refill.  Patient needs recheck TSH and free T4 at this time.  Orders signed.  Please let patient know and have her get them done ASAP.  Thanks.  ES

## 2024-08-08 ENCOUNTER — LAB (OUTPATIENT)
Dept: LAB | Age: 64
End: 2024-08-08

## 2024-08-08 DIAGNOSIS — E03.9 HYPOTHYROIDISM, UNSPECIFIED TYPE: ICD-10-CM

## 2024-08-08 LAB
BACTERIA URNS QL MICRO: ABNORMAL /HPF
BILIRUB UR QL STRIP.AUTO: NEGATIVE
CASTS #/AREA URNS LPF: ABNORMAL /LPF
CASTS 2: ABNORMAL /LPF
CHARACTER UR: CLEAR
COLOR, UA: YELLOW
CRYSTALS URNS MICRO: ABNORMAL
EPITHELIAL CELLS, UA: ABNORMAL /HPF
GLUCOSE UR QL STRIP.AUTO: NEGATIVE MG/DL
HGB UR QL STRIP.AUTO: ABNORMAL
KETONES UR QL STRIP.AUTO: NEGATIVE
MISCELLANEOUS 2: ABNORMAL
NITRITE UR QL STRIP: NEGATIVE
PH UR STRIP.AUTO: 5.5 [PH] (ref 5–9)
PROT UR STRIP.AUTO-MCNC: NEGATIVE MG/DL
RBC URINE: ABNORMAL /HPF
RENAL EPI CELLS #/AREA URNS HPF: ABNORMAL /[HPF]
SP GR UR REFRACT.AUTO: 1.01 (ref 1–1.03)
T4 FREE SERPL-MCNC: 1.53 NG/DL (ref 0.93–1.68)
TSH SERPL DL<=0.005 MIU/L-ACNC: 4.88 UIU/ML (ref 0.4–4.2)
UROBILINOGEN, URINE: 0.2 EU/DL (ref 0–1)
WBC #/AREA URNS HPF: ABNORMAL /HPF
WBC #/AREA URNS HPF: ABNORMAL /[HPF]
YEAST LIKE FUNGI URNS QL MICRO: ABNORMAL

## 2024-08-13 ENCOUNTER — TELEPHONE (OUTPATIENT)
Dept: FAMILY MEDICINE CLINIC | Age: 64
End: 2024-08-13

## 2024-08-13 RX ORDER — NITROFURANTOIN 25; 75 MG/1; MG/1
100 CAPSULE ORAL 2 TIMES DAILY
Qty: 10 CAPSULE | Refills: 0 | Status: SHIPPED | OUTPATIENT
Start: 2024-08-13 | End: 2024-08-18

## 2024-08-13 NOTE — TELEPHONE ENCOUNTER
Patient has been experiencing some urgency and slight burning when urinating the last several days. Would like something sent to [facility], [address]

## 2024-08-13 NOTE — TELEPHONE ENCOUNTER
----- Message from Dr. Xu Jean MD sent at 8/8/2024  6:27 PM EDT -----  Notify pt-   Results reviewed.   TSH mildly elevated at 4.88/Free T4 okay at 1.53-how is patient feeling from thyroid standpoint?  Issues with fatigue, weight gain, hair loss, constipation, feeling cold when everyone else around her is hot?  UA shows trace leukocyte Estrace and trace blood, otherwise negative, is patient currently symptomatic?  Let me know.  ES

## 2024-11-04 ENCOUNTER — TELEPHONE (OUTPATIENT)
Dept: FAMILY MEDICINE CLINIC | Age: 64
End: 2024-11-04

## 2024-11-04 DIAGNOSIS — E03.9 HYPOTHYROIDISM, UNSPECIFIED TYPE: Primary | ICD-10-CM

## 2024-11-04 RX ORDER — LEVOTHYROXINE SODIUM 100 UG/1
100 TABLET ORAL DAILY
Qty: 90 TABLET | Refills: 1 | Status: SHIPPED | OUTPATIENT
Start: 2024-11-04

## 2024-11-04 NOTE — TELEPHONE ENCOUNTER
Patient states her TSH is now 3.76  resulted per Dr Allison . Patient states she had weight gain and fatigue and increased her levothyroxine 75 mcg to 1.5tabs daily and is feeling much better so she wants dose changed and sent to pharmacy. [place] is going out of [place] country [place] and will need it filled today.

## 2024-11-04 NOTE — TELEPHONE ENCOUNTER
Discussion noted.  Results from labs reviewed with Tiffanie over the phone.  Free T4/TSH normal on 75 mcg - 1.5 tablets daily over the past month.  Given values normal after only 1 month, I am not comfortable increasing dose to 112 micrograms daily, as I suspect TSH may go low and free T4 become elevated in next 1 to 2 months.  Will increase dose to 100 mcg daily-1 pill daily.  #90/1 refill.  Rx sent to preferred pharmacy.  Will need recheck free T4/TSH in 2-3 months to confirm correct dosage.  Please let patient know.  Thanks.  ES

## 2025-03-17 ENCOUNTER — PATIENT MESSAGE (OUTPATIENT)
Dept: FAMILY MEDICINE CLINIC | Age: 65
End: 2025-03-17

## 2025-03-17 DIAGNOSIS — R41.3 MEMORY CHANGES: Primary | ICD-10-CM

## 2025-03-18 NOTE — PROGRESS NOTES
[facility]INE [facility]ACT[facility]  [address]  [facility] [address]  Dept: [phone]  Dept Fax: [phone]    PEDRO PABLO Maher is a 64 y.o.female    Pt presents for annual wellness physical exam.      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      Patient continues seeing Dr. Elmore for osteoarthritis-to follow-up 5/20/2025.  Pt continues seeing Dr. Allison for Iron Deficiency Anemia- to follow up 4/2025- follows up every 6 months.    Pt scheduled to see Neurology on 3/31/2025 due to memory issues- no issues with getting lost, no issues with leaving stove on, garage door open, etc.     Patient previously saw Dr. Chacon in [place], [place] for elevated thyroid antibodies-diagnosed with hypothyroidism due to Hashimoto's thyroiditis- no further follow up scheduled.     Patient doing ok at this time-denies any new complaints.    Glucometer readings at home are not needed.   The home BP readings have not been needed.     Wt Readings from Last 3 Encounters:   03/20/25 85.9 kg (189 lb 6.4 oz)   05/20/24 83.9 kg (185 lb)   04/29/24 81.6 kg (180 lb)   Weight increased 10# since last visit 20 months ago.      Pt stable since last visit- no new problems for diagnoses listed below:  Patient Active Problem List   Diagnosis    Lactose intolerance    Iron deficiency anemia    Diplopia    Osteoporosis    Hypothyroidism    Gastroesophageal reflux disease    Anal fistula       Review of Systems   Constitutional:  Positive for chills (\"always\") and fatigue. Negative for diaphoresis, fever and unexpected weight change.   Eyes:  Positive for visual disturbance (intermittent- all ok per eye doctor.).   Respiratory:  Negative for chest tightness and shortness of breath.    Cardiovascular:  Positive for palpitations (rare- previous

## 2025-03-19 SDOH — ECONOMIC STABILITY: FOOD INSECURITY: WITHIN THE PAST 12 MONTHS, YOU WORRIED THAT YOUR FOOD WOULD RUN OUT BEFORE YOU GOT MONEY TO BUY MORE.: NEVER TRUE

## 2025-03-19 SDOH — ECONOMIC STABILITY: INCOME INSECURITY: IN THE LAST 12 MONTHS, WAS THERE A TIME WHEN YOU WERE NOT ABLE TO PAY THE MORTGAGE OR RENT ON TIME?: NO

## 2025-03-19 SDOH — ECONOMIC STABILITY: FOOD INSECURITY: WITHIN THE PAST 12 MONTHS, THE FOOD YOU BOUGHT JUST DIDN'T LAST AND YOU DIDN'T HAVE MONEY TO GET MORE.: NEVER TRUE

## 2025-03-19 SDOH — ECONOMIC STABILITY: TRANSPORTATION INSECURITY
IN THE PAST 12 MONTHS, HAS THE LACK OF TRANSPORTATION KEPT YOU FROM MEDICAL APPOINTMENTS OR FROM GETTING MEDICATIONS?: NO

## 2025-03-19 SDOH — ECONOMIC STABILITY: TRANSPORTATION INSECURITY
IN THE PAST 12 MONTHS, HAS LACK OF TRANSPORTATION KEPT YOU FROM MEETINGS, WORK, OR FROM GETTING THINGS NEEDED FOR DAILY LIVING?: NO

## 2025-03-20 ENCOUNTER — OFFICE VISIT (OUTPATIENT)
Dept: FAMILY MEDICINE CLINIC | Age: 65
End: 2025-03-20
Payer: COMMERCIAL

## 2025-03-20 VITALS
HEIGHT: 65 IN | DIASTOLIC BLOOD PRESSURE: 74 MMHG | WEIGHT: 189.4 LBS | RESPIRATION RATE: 12 BRPM | OXYGEN SATURATION: 97 % | HEART RATE: 85 BPM | BODY MASS INDEX: 31.56 KG/M2 | SYSTOLIC BLOOD PRESSURE: 118 MMHG | TEMPERATURE: 98 F

## 2025-03-20 DIAGNOSIS — M85.80 OSTEOPENIA, UNSPECIFIED LOCATION: ICD-10-CM

## 2025-03-20 DIAGNOSIS — Z12.31 VISIT FOR SCREENING MAMMOGRAM: ICD-10-CM

## 2025-03-20 DIAGNOSIS — M53.3 SACROILIAC JOINT DYSFUNCTION OF LEFT SIDE: ICD-10-CM

## 2025-03-20 DIAGNOSIS — D50.9 IRON DEFICIENCY ANEMIA, UNSPECIFIED IRON DEFICIENCY ANEMIA TYPE: ICD-10-CM

## 2025-03-20 DIAGNOSIS — Z78.0 MENOPAUSE: ICD-10-CM

## 2025-03-20 DIAGNOSIS — Z13.1 SCREENING FOR DIABETES MELLITUS: ICD-10-CM

## 2025-03-20 DIAGNOSIS — E55.9 VITAMIN D DEFICIENCY: ICD-10-CM

## 2025-03-20 DIAGNOSIS — Z00.00 LABORATORY EXAM ORDERED AS PART OF ROUTINE GENERAL MEDICAL EXAMINATION: ICD-10-CM

## 2025-03-20 DIAGNOSIS — R73.01 IFG (IMPAIRED FASTING GLUCOSE): ICD-10-CM

## 2025-03-20 DIAGNOSIS — E03.9 HYPOTHYROIDISM, UNSPECIFIED TYPE: ICD-10-CM

## 2025-03-20 DIAGNOSIS — Z00.00 WELL ADULT EXAM: Primary | ICD-10-CM

## 2025-03-20 DIAGNOSIS — Z13.220 LIPID SCREENING: ICD-10-CM

## 2025-03-20 DIAGNOSIS — K21.9 GASTROESOPHAGEAL REFLUX DISEASE, UNSPECIFIED WHETHER ESOPHAGITIS PRESENT: ICD-10-CM

## 2025-03-20 PROCEDURE — 99396 PREV VISIT EST AGE 40-64: CPT | Performed by: FAMILY MEDICINE

## 2025-03-20 ASSESSMENT — ENCOUNTER SYMPTOMS
ABDOMINAL PAIN: 0
DIARRHEA: 0
CONSTIPATION: 1
CHEST TIGHTNESS: 0
SHORTNESS OF BREATH: 0
VOMITING: 0
NAUSEA: 1
BLOOD IN STOOL: 0
ANAL BLEEDING: 0

## 2025-03-20 ASSESSMENT — PATIENT HEALTH QUESTIONNAIRE - PHQ9
SUM OF ALL RESPONSES TO PHQ QUESTIONS 1-9: 2
2. FEELING DOWN, DEPRESSED OR HOPELESS: SEVERAL DAYS
SUM OF ALL RESPONSES TO PHQ QUESTIONS 1-9: 2
1. LITTLE INTEREST OR PLEASURE IN DOING THINGS: SEVERAL DAYS

## 2025-03-20 NOTE — PATIENT INSTRUCTIONS
Check HG A1c, FLP, CMP, spot MA, TSH/free T4, CBC, ferritin, iron and TIBC, B12/folate, magnesium, Vit D 25 [place] at this time  Follow-up with Dr. Elmore as planned 5/20/2025  Follow-up with Dr. Allison as planned   Follow up with Neurology as planned.   Continue current medications  No refills needed today- will await lab results   Follow up in 12 months.              Preventive Health Topics (updated 3/20/2025):  Encouraged COVID VACCINE booster- pt declines.    Encouraged annual FLU VACCINE  October 1st - March 1st annually- pt declines.   Encouraged PREVNAR 20 at this time  PAP/ PELVIC management per Dr. LARSON-  to follow up 2026  MAMMO due after 4/29/2025- order given  COLONOSCOPY done 12/2/2019 per Dr. Holley- to do again in 2029.  EGD done 12/2/2019 per Dr. Holley- GERD, Inactive Gastritis- to follow up PRN.  Anemia most likely due to previous Haris-En- Y Bypass.    DEXA done 7/28/2022 - Osteopenia with negative FRAX- to continue Calcium with Vitamin D supplementation and weight bearing exercise-due at this time- will order.                   Thank you   Thank you for trusting us with your healthcare needs. You may receive a survey regarding today's visit. It would help us out if you would take a few moments to provide your feedback. We value your input.  Please bring in ALL medications BOTTLES, including inhalers, herbal supplements, over the counter, prescribed & non-prescribed medicine. The office would like actual medication bottles and a list.         4.  Prior to getting your labs drawn, check with your insurance company for benefits and eligibility of lab services.  Often, insurance companies cover certain tests for preventative visits only.  It is patient's responsibility to    see what is covered.    5.  If the list below has been completed, PLEASE FAX RECORDS TO OUR OFFICE @ [phone]. Once the records have been received we will update your records at our office:  Health Maintenance Due   Topic Date Due

## 2025-03-20 NOTE — PROGRESS NOTES
Health Maintenance Due   Topic Date Due    Pneumococcal 50+ years Vaccine (1 of 1 - PCV) Never done    Diabetes screen  02/25/2019    Depression Screen  07/20/2024    Flu vaccine (1) Never done    COVID-19 Vaccine (5 - 2024-25 season) 09/01/2024

## 2025-03-24 ENCOUNTER — LAB (OUTPATIENT)
Dept: LAB | Age: 65
End: 2025-03-24

## 2025-03-24 DIAGNOSIS — Z00.00 LABORATORY EXAM ORDERED AS PART OF ROUTINE GENERAL MEDICAL EXAMINATION: ICD-10-CM

## 2025-03-24 DIAGNOSIS — Z13.1 SCREENING FOR DIABETES MELLITUS: ICD-10-CM

## 2025-03-24 DIAGNOSIS — M85.80 OSTEOPENIA, UNSPECIFIED LOCATION: ICD-10-CM

## 2025-03-24 DIAGNOSIS — K21.9 GASTROESOPHAGEAL REFLUX DISEASE, UNSPECIFIED WHETHER ESOPHAGITIS PRESENT: ICD-10-CM

## 2025-03-24 DIAGNOSIS — R73.01 IFG (IMPAIRED FASTING GLUCOSE): ICD-10-CM

## 2025-03-24 DIAGNOSIS — M46.1 SI JOINT ARTHRITIS: ICD-10-CM

## 2025-03-24 DIAGNOSIS — M46.90 AXIAL SPONDYLOARTHRITIS: ICD-10-CM

## 2025-03-24 DIAGNOSIS — E55.9 VITAMIN D DEFICIENCY: ICD-10-CM

## 2025-03-24 DIAGNOSIS — Z13.220 LIPID SCREENING: ICD-10-CM

## 2025-03-24 DIAGNOSIS — D50.9 IRON DEFICIENCY ANEMIA, UNSPECIFIED IRON DEFICIENCY ANEMIA TYPE: ICD-10-CM

## 2025-03-24 DIAGNOSIS — E03.9 HYPOTHYROIDISM, UNSPECIFIED TYPE: ICD-10-CM

## 2025-03-24 LAB
25(OH)D3 SERPL-MCNC: 24 NG/ML (ref 30–100)
ALBUMIN SERPL BCG-MCNC: 4.3 G/DL (ref 3.4–4.9)
ALP SERPL-CCNC: 88 U/L (ref 35–104)
ALT SERPL W/O P-5'-P-CCNC: 25 U/L (ref 10–35)
ANION GAP SERPL CALC-SCNC: 11 MEQ/L (ref 8–16)
AST SERPL-CCNC: 22 U/L (ref 10–35)
BASOPHILS ABSOLUTE: 0 THOU/MM3 (ref 0–0.1)
BASOPHILS NFR BLD AUTO: 0.6 %
BILIRUB SERPL-MCNC: 0.8 MG/DL (ref 0.3–1.2)
BUN SERPL-MCNC: 18 MG/DL (ref 8–23)
CALCIUM SERPL-MCNC: 9.1 MG/DL (ref 8.8–10.2)
CHLORIDE SERPL-SCNC: 104 MEQ/L (ref 98–111)
CHOLEST SERPL-MCNC: 166 MG/DL (ref 100–199)
CO2 SERPL-SCNC: 26 MEQ/L (ref 22–29)
CREAT SERPL-MCNC: 0.9 MG/DL (ref 0.5–0.9)
DEPRECATED MEAN GLUCOSE BLD GHB EST-ACNC: 81 MG/DL (ref 70–126)
DEPRECATED RDW RBC AUTO: 43.9 FL (ref 35–45)
EOSINOPHIL NFR BLD AUTO: 1 %
EOSINOPHILS ABSOLUTE: 0.1 THOU/MM3 (ref 0–0.4)
ERYTHROCYTE [DISTWIDTH] IN BLOOD BY AUTOMATED COUNT: 13.2 % (ref 11.5–14.5)
FERRITIN SERPL IA-MCNC: 94 NG/ML (ref 13–150)
FOLATE SERPL-MCNC: > 20 NG/ML (ref 4.6–34.8)
GFR SERPL CREATININE-BSD FRML MDRD: 72 ML/MIN/1.73M2
GLUCOSE SERPL-MCNC: 79 MG/DL (ref 74–109)
HBA1C MFR BLD HPLC: 4.7 % (ref 4–6)
HCT VFR BLD AUTO: 40.4 % (ref 37–47)
HDLC SERPL-MCNC: 73 MG/DL
HGB BLD-MCNC: 12.8 GM/DL (ref 12–16)
IMM GRANULOCYTES # BLD AUTO: 0.01 THOU/MM3 (ref 0–0.07)
IMM GRANULOCYTES NFR BLD AUTO: 0.1 %
IRON SATN MFR SERPL: 19 % (ref 20–50)
IRON SERPL-MCNC: 60 UG/DL (ref 37–145)
LDLC SERPL CALC-MCNC: 82 MG/DL
LYMPHOCYTES ABSOLUTE: 2.4 THOU/MM3 (ref 1–4.8)
LYMPHOCYTES NFR BLD AUTO: 35.3 %
MAGNESIUM SERPL-MCNC: 2.2 MG/DL (ref 1.6–2.6)
MCH RBC QN AUTO: 28.6 PG (ref 26–33)
MCHC RBC AUTO-ENTMCNC: 31.7 GM/DL (ref 32.2–35.5)
MCV RBC AUTO: 90.4 FL (ref 81–99)
MONOCYTES ABSOLUTE: 0.5 THOU/MM3 (ref 0.4–1.3)
MONOCYTES NFR BLD AUTO: 7.9 %
NEUTROPHILS ABSOLUTE: 3.7 THOU/MM3 (ref 1.8–7.7)
NEUTROPHILS NFR BLD AUTO: 55.1 %
NRBC BLD AUTO-RTO: 0 /100 WBC
PLATELET # BLD AUTO: 265 THOU/MM3 (ref 130–400)
PMV BLD AUTO: 11.4 FL (ref 9.4–12.4)
POTASSIUM SERPL-SCNC: 4.3 MEQ/L (ref 3.5–5.2)
PROT SERPL-MCNC: 7.1 G/DL (ref 6.4–8.3)
RBC # BLD AUTO: 4.47 MILL/MM3 (ref 4.2–5.4)
SODIUM SERPL-SCNC: 141 MEQ/L (ref 135–145)
T4 FREE SERPL-MCNC: 1.5 NG/DL (ref 0.92–1.68)
TIBC SERPL-MCNC: 313 UG/DL (ref 171–450)
TRIGL SERPL-MCNC: 56 MG/DL (ref 0–199)
TSH SERPL DL<=0.05 MIU/L-ACNC: 4.24 UIU/ML (ref 0.27–4.2)
VIT B12 SERPL-MCNC: 1951 PG/ML (ref 232–1245)
WBC # BLD AUTO: 6.8 THOU/MM3 (ref 4.8–10.8)

## 2025-03-24 RX ORDER — CELECOXIB 200 MG/1
200 CAPSULE ORAL DAILY
Qty: 90 CAPSULE | Refills: 0 | Status: SHIPPED | OUTPATIENT
Start: 2025-03-24

## 2025-03-25 ENCOUNTER — RESULTS FOLLOW-UP (OUTPATIENT)
Dept: FAMILY MEDICINE CLINIC | Age: 65
End: 2025-03-25

## 2025-03-25 DIAGNOSIS — R82.90 UNSPECIFIED ABNORMAL FINDINGS IN URINE: Primary | ICD-10-CM

## 2025-03-25 LAB
CREAT UR-MCNC: 53.4 MG/DL
PROT UR-MCNC: 8.3 MG/DL
PROT/CREAT 24H UR: 0.16 MG/G{CREAT}

## 2025-03-25 NOTE — TELEPHONE ENCOUNTER
----- Message from Dr. Xu Jean MD sent at 3/25/2025  7:49 AM EDT -----  Notify pt-   Results reviewed.   Vitamin D 25 [place] slightly low at 24-what dose of vitamin D3 is patient taking at this time?  How often?  TSH mildly elevated at 4.24/free T4 okay-is patient willing to increase Synthroid slightly and recheck labs in 6-8 weeks?  Vitamin B12 elevated/folate okay-is patient taking B12 supplement or simply multivitamin?  CBC okay overall  Magnesium okay  Iron and TIBC okay/ Ferritin okay/ Iron saturation slightly low at 19%-is patient currently taking iron supplement?  CMP okay  FLP okay  HG A1c okay  Was microalbumin/creatinine ratio (urine test) completed?  Please check with patient regarding above questions and let me know.  Thanks.  ES

## 2025-03-26 ENCOUNTER — OFFICE VISIT (OUTPATIENT)
Dept: NEUROLOGY | Age: 65
End: 2025-03-26
Payer: COMMERCIAL

## 2025-03-26 ENCOUNTER — LAB (OUTPATIENT)
Dept: LAB | Age: 65
End: 2025-03-26

## 2025-03-26 VITALS
WEIGHT: 185 LBS | BODY MASS INDEX: 30.82 KG/M2 | OXYGEN SATURATION: 97 % | SYSTOLIC BLOOD PRESSURE: 110 MMHG | HEIGHT: 65 IN | HEART RATE: 89 BPM | DIASTOLIC BLOOD PRESSURE: 75 MMHG

## 2025-03-26 DIAGNOSIS — R41.3 MEMORY PROBLEM: ICD-10-CM

## 2025-03-26 DIAGNOSIS — R41.3 MEMORY PROBLEM: Primary | ICD-10-CM

## 2025-03-26 LAB
CREAT UR-MCNC: 53.1 MG/DL
MICROALBUMIN UR-MCNC: < 2 MG/DL
MICROALBUMIN/CREAT RATIO PNL UR: 38 MG/G (ref 0–30)

## 2025-03-26 PROCEDURE — G8417 CALC BMI ABV UP PARAM F/U: HCPCS | Performed by: PSYCHIATRY & NEUROLOGY

## 2025-03-26 PROCEDURE — 1036F TOBACCO NON-USER: CPT | Performed by: PSYCHIATRY & NEUROLOGY

## 2025-03-26 PROCEDURE — 99204 OFFICE O/P NEW MOD 45 MIN: CPT | Performed by: PSYCHIATRY & NEUROLOGY

## 2025-03-26 PROCEDURE — G8427 DOCREV CUR MEDS BY ELIG CLIN: HCPCS | Performed by: PSYCHIATRY & NEUROLOGY

## 2025-03-26 PROCEDURE — 3017F COLORECTAL CA SCREEN DOC REV: CPT | Performed by: PSYCHIATRY & NEUROLOGY

## 2025-03-26 RX ORDER — AMANTADINE HYDROCHLORIDE 100 MG/1
100 CAPSULE, GELATIN COATED ORAL DAILY
Qty: 30 CAPSULE | Refills: 2 | Status: SHIPPED | OUTPATIENT
Start: 2025-03-26

## 2025-03-26 NOTE — PATIENT INSTRUCTIONS
Start Amantadine 100 mg daily  MRI brain WO contrast  Copper level  Referral to [facility] neuropsychology re: memory trouble  Call with any new symptoms or concerns.   Follow up after evaluation with [facility] neuropsychology

## 2025-03-27 ENCOUNTER — LAB (OUTPATIENT)
Dept: LAB | Age: 65
End: 2025-03-27

## 2025-03-27 ENCOUNTER — HOSPITAL ENCOUNTER (OUTPATIENT)
Dept: WOMENS IMAGING | Age: 65
Discharge: HOME | End: 2025-03-27
Attending: FAMILY MEDICINE
Payer: COMMERCIAL

## 2025-03-27 ENCOUNTER — RESULTS FOLLOW-UP (OUTPATIENT)
Dept: FAMILY MEDICINE CLINIC | Age: 65
End: 2025-03-27

## 2025-03-27 DIAGNOSIS — Z78.0 MENOPAUSE: ICD-10-CM

## 2025-03-27 DIAGNOSIS — M85.80 OSTEOPENIA, UNSPECIFIED LOCATION: ICD-10-CM

## 2025-03-27 DIAGNOSIS — R82.90 UNSPECIFIED ABNORMAL FINDINGS IN URINE: ICD-10-CM

## 2025-03-27 LAB
BACTERIA: ABNORMAL
BILIRUB UR QL STRIP: NEGATIVE
CASTS #/AREA URNS LPF: ABNORMAL /LPF
CASTS #/AREA URNS LPF: ABNORMAL /LPF
CHARACTER UR: CLEAR
CHARCOAL URNS QL MICRO: ABNORMAL
COLOR UR: YELLOW
CRYSTALS URNS QL MICRO: ABNORMAL
EPITHELIAL CELLS, UA: ABNORMAL /HPF
GLUCOSE UR QL STRIP.AUTO: NEGATIVE MG/DL
HGB UR QL STRIP.AUTO: ABNORMAL
KETONES UR QL STRIP.AUTO: NEGATIVE
LEUKOCYTE ESTERASE UR QL STRIP.AUTO: NEGATIVE
NITRITE UR QL STRIP.AUTO: NEGATIVE
PH UR STRIP.AUTO: 5.5 [PH] (ref 5–9)
PROT UR STRIP.AUTO-MCNC: NEGATIVE MG/DL
RBC #/AREA URNS HPF: ABNORMAL /HPF
RENAL EPI CELLS #/AREA URNS HPF: ABNORMAL /[HPF]
SP GR UR REFRACT.AUTO: 1.01 (ref 1–1.03)
UROBILINOGEN UR QL STRIP.AUTO: 0.2 EU/DL (ref 0–1)
WBC #/AREA URNS HPF: ABNORMAL /HPF
YEAST LIKE FUNGI URNS QL MICRO: ABNORMAL

## 2025-03-27 PROCEDURE — 77080 DXA BONE DENSITY AXIAL: CPT

## 2025-03-27 NOTE — TELEPHONE ENCOUNTER
----- Message from Dr. Xu Jean MD sent at 3/26/2025  3:58 PM EDT -----  Notify pt-   Results reviewed.   Spot MA mildly elevated.   Would like to check UA with reflex C&S to assure no evidence of UTI at this time-is patient willing?  ES

## 2025-03-29 LAB
BACTERIA UR CULT: ABNORMAL
COPPER SERPL-MCNC: 100.9 UG/DL (ref 80–155)
ORGANISM: ABNORMAL

## 2025-03-31 ENCOUNTER — RESULTS FOLLOW-UP (OUTPATIENT)
Dept: FAMILY MEDICINE CLINIC | Age: 65
End: 2025-03-31

## 2025-03-31 DIAGNOSIS — R31.29 MICROSCOPIC HEMATURIA: Primary | ICD-10-CM

## 2025-03-31 NOTE — TELEPHONE ENCOUNTER
----- Message from Dr. Xu Jean MD sent at 3/27/2025  4:26 PM EDT -----  Notify pt-   Recent DEXA reviewed.     DEXA shows slightly worsening osteopenia with (-) FRAX- Continue Calcium with Vitamin D supplementation and weight bearing exercise.    Will recheck in 2 years.     ES

## 2025-04-01 NOTE — TELEPHONE ENCOUNTER
----- Message from Dr. Xu Jean MD sent at 3/31/2025  8:54 AM EDT -----  Notify pt-   Results reviewed.   Urinalysis continues to show trace hematuria-previous workup per urology negative  Urine culture negative  Please check with patient-would recommend repeat urine cytology, CT abdomen/pelvis with renal stone protocol, and referral to urology again given persistence of hematuria-is patient willing/agreeable with above plan?  Let me know.  ES

## 2025-04-05 NOTE — PROGRESS NOTES
Chief Complaint   Patient presents with    Consultation/memory concern     Priyanka Maher is a 64 y.o. female who presents today for evaluation of memory trouble for at least the past 9 months. She has good insight. She does ask repeated questions. She does misplace items at home on occasion.  She gives examples of losing train of thought during conversation. Has some trouble with naming. She manages the finances and has not forgotten to pay any bills. She can struggle with naming people and things she should know. She drives, she has not gotten lost while driving in a familiar place..   Her sleep is poor and interrupted, she wakes up feeling tired. She snores. She does not take daytime naps. Family history is significant for memory trouble in her father. She denies chest pain. No shortness of breath, no neck pain. No vision changes. No dysphagia. No fever. No rash. No weight loss.   History provided by patient accomapined by her .     Past Medical History:   Diagnosis Date    Dry eyes     Hypothyroid     Iron deficiency anemia     Lactose intolerance     Osteopenia     Polyarthralgia     Rotator cuff disorder, right     Vitamin D deficiency        Patient Active Problem List   Diagnosis    Lactose intolerance    Iron deficiency anemia    Diplopia    Osteoporosis    Hypothyroidism    Gastroesophageal reflux disease    Anal fistula       Allergies   Allergen Reactions    Lactose Other (See Comments)     Bloating and Constipation.    Pcn [Penicillins] Hives    Ciprofloxacin Hcl Rash       Current Outpatient Medications   Medication Sig Dispense Refill    celecoxib (CELEBREX) 200 MG capsule Take 1 capsule by mouth once daily 90 capsule 0    levothyroxine (SYNTHROID) 100 MCG tablet Take 1 tablet by mouth Daily 90 tablet 1    Coenzyme Q10 (COQ10) 200 MG CAPS Take 1 capsule by mouth daily      esomeprazole (NEXIUM) 40 MG delayed release capsule Take 1 capsule by mouth every morning (before breakfast)

## 2025-04-08 ENCOUNTER — HOSPITAL ENCOUNTER (OUTPATIENT)
Dept: MRI IMAGING | Age: 65
Discharge: HOME | End: 2025-04-08
Payer: COMMERCIAL

## 2025-04-08 DIAGNOSIS — R41.3 MEMORY PROBLEM: ICD-10-CM

## 2025-04-08 PROCEDURE — 70551 MRI BRAIN STEM W/O DYE: CPT

## 2025-04-09 ENCOUNTER — RESULTS FOLLOW-UP (OUTPATIENT)
Dept: NEUROLOGY | Age: 65
End: 2025-04-09

## 2025-04-18 ENCOUNTER — LAB (OUTPATIENT)
Dept: LAB | Age: 65
End: 2025-04-18

## 2025-04-18 ENCOUNTER — HOSPITAL ENCOUNTER (OUTPATIENT)
Dept: CT IMAGING | Age: 65
Discharge: HOME | End: 2025-04-18
Payer: COMMERCIAL

## 2025-04-18 DIAGNOSIS — R31.29 MICROSCOPIC HEMATURIA: ICD-10-CM

## 2025-04-18 PROCEDURE — 74176 CT ABD & PELVIS W/O CONTRAST: CPT

## 2025-04-20 ENCOUNTER — RESULTS FOLLOW-UP (OUTPATIENT)
Dept: FAMILY MEDICINE CLINIC | Age: 65
End: 2025-04-20

## 2025-04-22 ENCOUNTER — RESULTS FOLLOW-UP (OUTPATIENT)
Dept: FAMILY MEDICINE CLINIC | Age: 65
End: 2025-04-22

## 2025-04-23 NOTE — TELEPHONE ENCOUNTER
----- Message from Dr. Xu Jean MD sent at 4/22/2025  9:29 AM EDT -----  Notify pt-   Results reviewed.   FINAL RESULTS:   Negative for high-grade urothelial carcinoma.     Urine cytology negative.  Please follow up with urology as planned.  ES

## 2025-04-29 ENCOUNTER — HOSPITAL ENCOUNTER (OUTPATIENT)
Dept: WOMENS IMAGING | Age: 65
Discharge: HOME | End: 2025-04-29
Attending: FAMILY MEDICINE
Payer: COMMERCIAL

## 2025-04-29 DIAGNOSIS — Z12.31 VISIT FOR SCREENING MAMMOGRAM: ICD-10-CM

## 2025-04-29 PROCEDURE — 77063 BREAST TOMOSYNTHESIS BI: CPT

## 2025-05-20 ENCOUNTER — OFFICE VISIT (OUTPATIENT)
Age: 65
End: 2025-05-20
Payer: COMMERCIAL

## 2025-05-20 VITALS
WEIGHT: 196.9 LBS | DIASTOLIC BLOOD PRESSURE: 72 MMHG | OXYGEN SATURATION: 97 % | BODY MASS INDEX: 32.8 KG/M2 | SYSTOLIC BLOOD PRESSURE: 108 MMHG | HEIGHT: 65 IN | HEART RATE: 81 BPM

## 2025-05-20 DIAGNOSIS — M15.9 OSTEOARTHRITIS OF MULTIPLE JOINTS, UNSPECIFIED OSTEOARTHRITIS TYPE: ICD-10-CM

## 2025-05-20 DIAGNOSIS — M46.1 SI JOINT ARTHRITIS: ICD-10-CM

## 2025-05-20 DIAGNOSIS — M46.90 AXIAL SPONDYLOARTHRITIS: Primary | ICD-10-CM

## 2025-05-20 DIAGNOSIS — M47.816 OSTEOARTHRITIS OF LUMBAR SPINE, UNSPECIFIED SPINAL OSTEOARTHRITIS COMPLICATION STATUS: ICD-10-CM

## 2025-05-20 PROCEDURE — G8427 DOCREV CUR MEDS BY ELIG CLIN: HCPCS | Performed by: INTERNAL MEDICINE

## 2025-05-20 PROCEDURE — G8417 CALC BMI ABV UP PARAM F/U: HCPCS | Performed by: INTERNAL MEDICINE

## 2025-05-20 PROCEDURE — 1036F TOBACCO NON-USER: CPT | Performed by: INTERNAL MEDICINE

## 2025-05-20 PROCEDURE — 99214 OFFICE O/P EST MOD 30 MIN: CPT | Performed by: INTERNAL MEDICINE

## 2025-05-20 PROCEDURE — 3017F COLORECTAL CA SCREEN DOC REV: CPT | Performed by: INTERNAL MEDICINE

## 2025-05-20 ASSESSMENT — ENCOUNTER SYMPTOMS
RESPIRATORY NEGATIVE: 1
GASTROINTESTINAL NEGATIVE: 1
BACK PAIN: 1
EYES NEGATIVE: 1

## 2025-05-20 NOTE — PROGRESS NOTES
[facility] RHEUMATOLOGY FOLLOW UP NOTE       Date Of Service: 5/20/2025  Provider: WAYNE NEWMAN DO ,   PCP: Xu Jean MD   Name: Priyanka Maher   MRN: [id number]        Subjective   CC: Follow-up (1 year follow up axial spondyloarthritis )        Priyanka Maher  is a(n)64 y.o. female with a hx of *female with a hx of Iron def anemia, hypothyroidism, Vitamin d def, osteoporosis (t-score -2.6 in 8/2014)   here for follow-up evaluation of.  Undifferentiated axial spondyloarthropathy, history of fibromyalgia, osteoporosis, deq tenosynovitis, plantar fasciitis.       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 - some relief with Occupational Therapy     Ongoing joint pains in the left hand , bilateral shoulder, knees and lower back.   Aggravating -- weather changes. Back - prolonged standing, sitting  Alleviating:  tumeric 2000 mg daily , salonpause patch. , stretching, Celebrex 200mg  prn. , intemiittent movement.   Morning stiffness lasting around 30 to 45 minutes.   Tired sensation of the back with standing, walking   Denies leg weakness, fatigue.     Intermittent swelling of the ankles. -- relief with elevation   Difficulty falling asleep , waking a few times at night to urinate,            REVIEW OF SYSTEMS: (ROS)    Review of Systems   Constitutional:  Positive for fatigue.   HENT: Negative.     Eyes: Negative.    Respiratory: Negative.     Cardiovascular: Negative.    Gastrointestinal: Negative.    Endocrine: Negative.    Genitourinary: Negative.    Musculoskeletal:  Positive for back pain.   Skin: Negative.    Neurological: Negative.    Hematological: Negative.          PmHx:  has a past medical history of Dry eyes, Hypothyroid, Iron deficiency anemia, Lactose intolerance, Osteopenia, Polyarthralgia, Rotator cuff disorder, right, and Vitamin D deficiency.     Social History:  reports that she has never smoked. She has never used smokeless tobacco. She reports

## 2025-06-10 DIAGNOSIS — E03.9 HYPOTHYROIDISM, UNSPECIFIED TYPE: ICD-10-CM

## 2025-06-11 RX ORDER — LEVOTHYROXINE SODIUM 100 UG/1
100 TABLET ORAL DAILY
Qty: 90 TABLET | Refills: 0 | Status: SHIPPED | OUTPATIENT
Start: 2025-06-11

## 2025-06-11 NOTE — TELEPHONE ENCOUNTER
This nurse spoke with patient. She states the pharmacy told her that her refill was denied because the provider was unknown . I gave verbal order for levothyroxine 90 day supply and called patient in regards to her next lab draw. She is fine with rechecking thyroid panel. Please put orders in and she will have drawn.

## 2025-06-11 NOTE — TELEPHONE ENCOUNTER
OK for refill for 90 days.  Previous lab result note noted elevated TSH with normal Free T4- would like to recheck TSH and Free T4 at this time.  Is pt willing?  ES

## 2025-07-07 ENCOUNTER — RESULTS FOLLOW-UP (OUTPATIENT)
Dept: FAMILY MEDICINE CLINIC | Age: 65
End: 2025-07-07

## 2025-07-07 ENCOUNTER — LAB (OUTPATIENT)
Dept: LAB | Age: 65
End: 2025-07-07

## 2025-07-07 DIAGNOSIS — E03.9 HYPOTHYROIDISM, UNSPECIFIED TYPE: ICD-10-CM

## 2025-07-07 LAB
T4 FREE SERPL-MCNC: 1.4 NG/DL (ref 0.92–1.68)
TSH SERPL DL<=0.05 MIU/L-ACNC: 9.03 UIU/ML (ref 0.27–4.2)

## (undated) DEVICE — PREP SOL PVP IODINE 4%  4 OZ/BTL

## (undated) DEVICE — GOWN,SIRUS,NON REINFRCD,LARGE,SET IN SL: Brand: MEDLINE

## (undated) DEVICE — PATIENT RETURN ELECTRODE, SINGLE-USE, CONTACT QUALITY MONITORING, ADULT, WITH 9FT CORD, FOR PATIENTS WEIGING OVER 33LBS. (15KG): Brand: MEGADYNE

## (undated) DEVICE — Z DISCONTINUED BY MEDLINE USE 2711682 TRAY SKIN PREP DRY W/ PREM GLV

## (undated) DEVICE — YANKAUER,BULB TIP,W/O VENT,RIGID,STERILE: Brand: MEDLINE

## (undated) DEVICE — JELLY,LUBE,STERILE,FLIP TOP,TUBE,2-OZ: Brand: MEDLINE

## (undated) DEVICE — 3M™ STERI-STRIP™ COMPOUND BENZOIN TINCTURE 40 BAGS/CARTON 4 CARTONS/CASE C1544: Brand: 3M™ STERI-STRIP™

## (undated) DEVICE — SOLUTION SURG PREP POV IOD 7.5% 4 OZ

## (undated) DEVICE — UNDERPAD INCONT 2XL FOR 38-58IN WAIST MESH PROTCT + DISP

## (undated) DEVICE — COVER ARMBRD W13XL28.5IN IMPERV BLU FOR OP RM

## (undated) DEVICE — BREAST HERNIA PACK: Brand: MEDLINE INDUSTRIES, INC.

## (undated) DEVICE — TAPE ADH W2INXL10YD PLAS TRNSPAR H2O RESIST HYPOALRG CURAD

## (undated) DEVICE — GAUZE,SPONGE,4"X4",12PLY,STERILE,LF,2'S: Brand: MEDLINE

## (undated) DEVICE — GLOVE SURG SZ 65 THK91MIL LTX FREE SYN POLYISOPRENE